# Patient Record
Sex: FEMALE | Race: WHITE | Employment: FULL TIME | URBAN - METROPOLITAN AREA
[De-identification: names, ages, dates, MRNs, and addresses within clinical notes are randomized per-mention and may not be internally consistent; named-entity substitution may affect disease eponyms.]

---

## 2020-10-14 NOTE — PROGRESS NOTES
[facility] / [facility] ([facility]) Mitra Baugh, [address]    Acknowledgment of Informed Consent for Surgical or Medical Procedure and Sedation  I agree to allow doctor(s) Mary Lou Agarwal and his/her associates or assistants, including residents and/or other qualified medical practitioner to perform the following medical treatment or procedure and to administer or direct the administration of sedation as necessary:  Procedure(s): ON THE LEFT: LAPIDUS ARTHRODESIS, [address]  My doctor has explained the following regarding the proposed procedure:   the explanation of the procedure   the benefits of the procedure   the potential problems that might occur during recuperation   the risks and side effects of the procedure which could include but are not limited to severe blood loss, infection, stroke or death   the benefits, risks and side effect of alternative procedures including the consequences of declining this procedure or any alternative procedures   the likelihood of achieving satisfactory results. I acknowledge no guarantee or assurance has been made to me regarding the results. I understand that during the course of this treatment/procedure, unforeseen conditions can occur which require an additional or different procedure. I agree to allow my physician or assistants to perform such extension of the original procedure as they may find necessary. I understand that sedation will often result in temporary impairment of memory and fine motor skills and that sedation can occasionally progress to a state of deep sedation or general anesthesia. I understand the risks of anesthesia for surgery include, but are not limited to, sore throat, hoarseness, injury to face, mouth, or teeth; nausea; headache; injury to blood vessels or nerves; death, brain damage, or paralysis.     I understand that if I have a Limitation of Treatment order in effect during my hospitalization, the order may or may not be in effect during this procedure. I give my doctor permission to give me blood or blood products. I understand that there are risks with receiving blood such as hepatitis, AIDS, fever, or allergic reaction. I acknowledge that the risks, benefits, and alternatives of this treatment have been explained to me and that no express or implied warranty has been given by the hospital, any blood bank, or any person or entity as to the blood or blood components transfused. At the discretion of my doctor, I agree to allow observers, equipment/product representatives and allow photographing, and/or televising of the procedure, provided my name or identity is maintained confidentially. I agree the hospital may dispose of or use for scientific or educational purposes any tissue, fluid, or body parts which may be removed.     ________________________________Date________Time______ am/pm  ([facility])  Patient or Signature of Closest Relative or Legal Guardian    ________________________________Date________Time______am/pm      Page 1 of  1  Witness

## 2020-10-21 RX ORDER — LORATADINE 10 MG/1
10 CAPSULE, LIQUID FILLED ORAL DAILY
COMMUNITY

## 2020-10-21 RX ORDER — RIVAROXABAN 10 MG/1
10 TABLET, FILM COATED ORAL
COMMUNITY

## 2020-10-21 RX ORDER — VALACYCLOVIR HYDROCHLORIDE 1 G/1
TABLET, FILM COATED ORAL
COMMUNITY
Start: 2016-10-13

## 2020-10-21 NOTE — PROGRESS NOTES
procedure. 13. Bring cases for your glasses, contacts, dentures, or hearing aids to protect them while you are in surgery. 16. If you use a CPAP, please bring it with you on the day of your procedure. 17. Do not shave or wax for 72 hours prior to procedure near your operative site  18. FOR WOMAN OF CHILDBEARING AGE ONLY- please bring a urine sample with you on day of surgery or make sure we can collect on arrival.     If you have further questions, you may contact your surgeon's office or us at [phone]     Left instructions on patient's voicemail. Marivel Levo. 10/21/2020 .2:52 PM

## 2020-10-22 ENCOUNTER — ANESTHESIA EVENT (OUTPATIENT)
Dept: OPERATING ROOM | Age: 45
End: 2020-10-22
Payer: COMMERCIAL

## 2020-10-23 ENCOUNTER — ANESTHESIA (OUTPATIENT)
Dept: OPERATING ROOM | Age: 45
End: 2020-10-23
Payer: COMMERCIAL

## 2020-10-23 ENCOUNTER — APPOINTMENT (OUTPATIENT)
Dept: GENERAL RADIOLOGY | Age: 45
End: 2020-10-23
Attending: PODIATRIST
Payer: COMMERCIAL

## 2020-10-23 ENCOUNTER — HOSPITAL ENCOUNTER (OUTPATIENT)
Age: 45
Setting detail: OUTPATIENT SURGERY
Discharge: HOME | End: 2020-10-23
Attending: PODIATRIST | Admitting: PODIATRIST
Payer: COMMERCIAL

## 2020-10-23 VITALS — DIASTOLIC BLOOD PRESSURE: 56 MMHG | OXYGEN SATURATION: 100 % | TEMPERATURE: 97 F | SYSTOLIC BLOOD PRESSURE: 120 MMHG

## 2020-10-23 VITALS
RESPIRATION RATE: 16 BRPM | HEIGHT: 64 IN | TEMPERATURE: 97 F | BODY MASS INDEX: 32.78 KG/M2 | OXYGEN SATURATION: 97 % | WEIGHT: 192 LBS | DIASTOLIC BLOOD PRESSURE: 87 MMHG | SYSTOLIC BLOOD PRESSURE: 142 MMHG | HEART RATE: 85 BPM

## 2020-10-23 LAB — PREGNANCY, URINE: NEGATIVE

## 2020-10-23 PROCEDURE — 2500000003 HC RX 250 WO HCPCS: Performed by: NURSE ANESTHETIST, CERTIFIED REGISTERED

## 2020-10-23 PROCEDURE — 2709999900 HC NON-CHARGEABLE SUPPLY: Performed by: PODIATRIST

## 2020-10-23 PROCEDURE — 2780000010 HC IMPLANT OTHER: Performed by: PODIATRIST

## 2020-10-23 PROCEDURE — 64447 NJX AA&/STRD FEMORAL NRV IMG: CPT | Performed by: ANESTHESIOLOGY

## 2020-10-23 PROCEDURE — C1713 ANCHOR/SCREW BN/BN,TIS/BN: HCPCS | Performed by: PODIATRIST

## 2020-10-23 PROCEDURE — 2580000003 HC RX 258: Performed by: ANESTHESIOLOGY

## 2020-10-23 PROCEDURE — 2500000003 HC RX 250 WO HCPCS: Performed by: PODIATRIST

## 2020-10-23 PROCEDURE — 7100000000 HC PACU RECOVERY - FIRST 15 MIN: Performed by: PODIATRIST

## 2020-10-23 PROCEDURE — 84703 CHORIONIC GONADOTROPIN ASSAY: CPT

## 2020-10-23 PROCEDURE — 7100000010 HC PHASE II RECOVERY - FIRST 15 MIN: Performed by: PODIATRIST

## 2020-10-23 PROCEDURE — 7100000001 HC PACU RECOVERY - ADDTL 15 MIN: Performed by: PODIATRIST

## 2020-10-23 PROCEDURE — 6360000002 HC RX W HCPCS: Performed by: ANESTHESIOLOGY

## 2020-10-23 PROCEDURE — 2580000003 HC RX 258: Performed by: PODIATRIST

## 2020-10-23 PROCEDURE — 2720000010 HC SURG SUPPLY STERILE: Performed by: PODIATRIST

## 2020-10-23 PROCEDURE — 3700000000 HC ANESTHESIA ATTENDED CARE: Performed by: PODIATRIST

## 2020-10-23 PROCEDURE — 6360000002 HC RX W HCPCS: Performed by: NURSE ANESTHETIST, CERTIFIED REGISTERED

## 2020-10-23 PROCEDURE — 73630 X-RAY EXAM OF FOOT: CPT

## 2020-10-23 PROCEDURE — 7100000011 HC PHASE II RECOVERY - ADDTL 15 MIN: Performed by: PODIATRIST

## 2020-10-23 PROCEDURE — 3600000004 HC SURGERY LEVEL 4 BASE: Performed by: PODIATRIST

## 2020-10-23 PROCEDURE — 3700000001 HC ADD 15 MINUTES (ANESTHESIA): Performed by: PODIATRIST

## 2020-10-23 PROCEDURE — 64445 NJX AA&/STRD SCIATIC NRV IMG: CPT | Performed by: ANESTHESIOLOGY

## 2020-10-23 PROCEDURE — 3600000014 HC SURGERY LEVEL 4 ADDTL 15MIN: Performed by: PODIATRIST

## 2020-10-23 DEVICE — POLYAXIAL LOCKING PLATE -  LAPIDUS CROSS-PLATE, LEFT (T10)
Type: IMPLANTABLE DEVICE | Site: FOOT | Status: FUNCTIONAL
Brand: ANCHORAGE

## 2020-10-23 DEVICE — CANNULATED SCREW
Type: IMPLANTABLE DEVICE | Site: FOOT | Status: FUNCTIONAL
Brand: ASNIS

## 2020-10-23 DEVICE — LOCKING SCREW
Type: IMPLANTABLE DEVICE | Site: FOOT | Status: FUNCTIONAL
Brand: VARIAX

## 2020-10-23 DEVICE — ALLOGRAFT HUM TISS 1 CC AMNIO TISS MEMBRN AMNIFLO CRYOPRES: Type: IMPLANTABLE DEVICE | Site: FOOT | Status: FUNCTIONAL

## 2020-10-23 DEVICE — CP LAG SCREW (T10)
Type: IMPLANTABLE DEVICE | Site: FOOT | Status: FUNCTIONAL
Brand: ANCHORAGE

## 2020-10-23 RX ORDER — MIDAZOLAM HYDROCHLORIDE 1 MG/ML
2 INJECTION INTRAMUSCULAR; INTRAVENOUS
Status: COMPLETED | OUTPATIENT
Start: 2020-10-23 | End: 2020-10-23

## 2020-10-23 RX ORDER — EPHEDRINE SULFATE 50 MG/ML
INJECTION, SOLUTION INTRAVENOUS PRN
Status: DISCONTINUED | OUTPATIENT
Start: 2020-10-23 | End: 2020-10-23 | Stop reason: SDUPTHER

## 2020-10-23 RX ORDER — HYDRALAZINE HYDROCHLORIDE 20 MG/ML
5 INJECTION INTRAMUSCULAR; INTRAVENOUS EVERY 5 MIN PRN
Status: DISCONTINUED | OUTPATIENT
Start: 2020-10-23 | End: 2020-10-23 | Stop reason: HOSPADM

## 2020-10-23 RX ORDER — MAGNESIUM HYDROXIDE 1200 MG/15ML
LIQUID ORAL CONTINUOUS PRN
Status: COMPLETED | OUTPATIENT
Start: 2020-10-23 | End: 2020-10-23

## 2020-10-23 RX ORDER — LIDOCAINE HYDROCHLORIDE 20 MG/ML
INJECTION, SOLUTION INTRAVENOUS PRN
Status: DISCONTINUED | OUTPATIENT
Start: 2020-10-23 | End: 2020-10-23 | Stop reason: SDUPTHER

## 2020-10-23 RX ORDER — LIDOCAINE HYDROCHLORIDE AND EPINEPHRINE 10; 10 MG/ML; UG/ML
INJECTION, SOLUTION INFILTRATION; PERINEURAL PRN
Status: DISCONTINUED | OUTPATIENT
Start: 2020-10-23 | End: 2020-10-23 | Stop reason: ALTCHOICE

## 2020-10-23 RX ORDER — MIDAZOLAM HYDROCHLORIDE 1 MG/ML
INJECTION INTRAMUSCULAR; INTRAVENOUS PRN
Status: DISCONTINUED | OUTPATIENT
Start: 2020-10-23 | End: 2020-10-23 | Stop reason: SDUPTHER

## 2020-10-23 RX ORDER — ROPIVACAINE HYDROCHLORIDE 5 MG/ML
INJECTION, SOLUTION EPIDURAL; INFILTRATION; PERINEURAL
Status: COMPLETED
Start: 2020-10-23 | End: 2020-10-23

## 2020-10-23 RX ORDER — DEXAMETHASONE SODIUM PHOSPHATE 4 MG/ML
INJECTION, SOLUTION INTRA-ARTICULAR; INTRALESIONAL; INTRAMUSCULAR; INTRAVENOUS; SOFT TISSUE PRN
Status: DISCONTINUED | OUTPATIENT
Start: 2020-10-23 | End: 2020-10-23 | Stop reason: SDUPTHER

## 2020-10-23 RX ORDER — ROPIVACAINE HYDROCHLORIDE 5 MG/ML
INJECTION, SOLUTION EPIDURAL; INFILTRATION; PERINEURAL PRN
Status: DISCONTINUED | OUTPATIENT
Start: 2020-10-23 | End: 2020-10-23 | Stop reason: SDUPTHER

## 2020-10-23 RX ORDER — FENTANYL CITRATE 50 UG/ML
100 INJECTION, SOLUTION INTRAMUSCULAR; INTRAVENOUS ONCE
Status: COMPLETED | OUTPATIENT
Start: 2020-10-23 | End: 2020-10-23

## 2020-10-23 RX ORDER — DEXAMETHASONE SODIUM PHOSPHATE 4 MG/ML
INJECTION, SOLUTION INTRA-ARTICULAR; INTRALESIONAL; INTRAMUSCULAR; INTRAVENOUS; SOFT TISSUE
Status: COMPLETED
Start: 2020-10-23 | End: 2020-10-23

## 2020-10-23 RX ORDER — LIDOCAINE HYDROCHLORIDE 10 MG/ML
1 INJECTION, SOLUTION EPIDURAL; INFILTRATION; INTRACAUDAL; PERINEURAL
Status: DISCONTINUED | OUTPATIENT
Start: 2020-10-23 | End: 2020-10-23 | Stop reason: HOSPADM

## 2020-10-23 RX ORDER — FENTANYL CITRATE 50 UG/ML
INJECTION, SOLUTION INTRAMUSCULAR; INTRAVENOUS PRN
Status: DISCONTINUED | OUTPATIENT
Start: 2020-10-23 | End: 2020-10-23 | Stop reason: SDUPTHER

## 2020-10-23 RX ORDER — SODIUM CHLORIDE 0.9 % (FLUSH) 0.9 %
10 SYRINGE (ML) INJECTION PRN
Status: DISCONTINUED | OUTPATIENT
Start: 2020-10-23 | End: 2020-10-23 | Stop reason: HOSPADM

## 2020-10-23 RX ORDER — CLINDAMYCIN PHOSPHATE 900 MG/50ML
900 INJECTION INTRAVENOUS ONCE
Status: COMPLETED | OUTPATIENT
Start: 2020-10-23 | End: 2020-10-23

## 2020-10-23 RX ORDER — ONDANSETRON 2 MG/ML
INJECTION INTRAMUSCULAR; INTRAVENOUS PRN
Status: DISCONTINUED | OUTPATIENT
Start: 2020-10-23 | End: 2020-10-23 | Stop reason: SDUPTHER

## 2020-10-23 RX ORDER — FENTANYL CITRATE 50 UG/ML
50 INJECTION, SOLUTION INTRAMUSCULAR; INTRAVENOUS EVERY 5 MIN PRN
Status: DISCONTINUED | OUTPATIENT
Start: 2020-10-23 | End: 2020-10-23 | Stop reason: HOSPADM

## 2020-10-23 RX ORDER — OXYCODONE HYDROCHLORIDE AND ACETAMINOPHEN 5; 325 MG/1; MG/1
1 TABLET ORAL
Status: DISCONTINUED | OUTPATIENT
Start: 2020-10-23 | End: 2020-10-23 | Stop reason: HOSPADM

## 2020-10-23 RX ORDER — ENALAPRILAT 2.5 MG/2ML
1.25 INJECTION INTRAVENOUS
Status: DISCONTINUED | OUTPATIENT
Start: 2020-10-23 | End: 2020-10-23 | Stop reason: HOSPADM

## 2020-10-23 RX ORDER — LABETALOL 20 MG/4 ML (5 MG/ML) INTRAVENOUS SYRINGE
5 EVERY 10 MIN PRN
Status: DISCONTINUED | OUTPATIENT
Start: 2020-10-23 | End: 2020-10-23 | Stop reason: HOSPADM

## 2020-10-23 RX ORDER — PROPOFOL 10 MG/ML
INJECTION, EMULSION INTRAVENOUS PRN
Status: DISCONTINUED | OUTPATIENT
Start: 2020-10-23 | End: 2020-10-23 | Stop reason: SDUPTHER

## 2020-10-23 RX ORDER — PHENYLEPHRINE HYDROCHLORIDE 10 MG/ML
INJECTION INTRAVENOUS PRN
Status: DISCONTINUED | OUTPATIENT
Start: 2020-10-23 | End: 2020-10-23 | Stop reason: SDUPTHER

## 2020-10-23 RX ORDER — SODIUM CHLORIDE 0.9 % (FLUSH) 0.9 %
10 SYRINGE (ML) INJECTION EVERY 12 HOURS SCHEDULED
Status: DISCONTINUED | OUTPATIENT
Start: 2020-10-23 | End: 2020-10-23 | Stop reason: HOSPADM

## 2020-10-23 RX ORDER — ONDANSETRON 2 MG/ML
4 INJECTION INTRAMUSCULAR; INTRAVENOUS
Status: DISCONTINUED | OUTPATIENT
Start: 2020-10-23 | End: 2020-10-23 | Stop reason: HOSPADM

## 2020-10-23 RX ORDER — SODIUM CHLORIDE, SODIUM LACTATE, POTASSIUM CHLORIDE, CALCIUM CHLORIDE 600; 310; 30; 20 MG/100ML; MG/100ML; MG/100ML; MG/100ML
INJECTION, SOLUTION INTRAVENOUS CONTINUOUS
Status: DISCONTINUED | OUTPATIENT
Start: 2020-10-23 | End: 2020-10-23 | Stop reason: HOSPADM

## 2020-10-23 RX ORDER — FENTANYL CITRATE 50 UG/ML
25 INJECTION, SOLUTION INTRAMUSCULAR; INTRAVENOUS EVERY 5 MIN PRN
Status: DISCONTINUED | OUTPATIENT
Start: 2020-10-23 | End: 2020-10-23 | Stop reason: HOSPADM

## 2020-10-23 RX ADMIN — FENTANYL CITRATE 100 MCG: 50 INJECTION INTRAMUSCULAR; INTRAVENOUS at 07:08

## 2020-10-23 RX ADMIN — ONDANSETRON 4 MG: 2 INJECTION INTRAMUSCULAR; INTRAVENOUS at 07:58

## 2020-10-23 RX ADMIN — SODIUM CHLORIDE, SODIUM LACTATE, POTASSIUM CHLORIDE, AND CALCIUM CHLORIDE: 600; 310; 30; 20 INJECTION, SOLUTION INTRAVENOUS at 07:29

## 2020-10-23 RX ADMIN — PHENYLEPHRINE HYDROCHLORIDE 100 MCG: 10 INJECTION INTRAVENOUS at 08:37

## 2020-10-23 RX ADMIN — PHENYLEPHRINE HYDROCHLORIDE 200 MCG: 10 INJECTION INTRAVENOUS at 08:19

## 2020-10-23 RX ADMIN — LIDOCAINE HYDROCHLORIDE 80 MG: 20 INJECTION, SOLUTION INTRAVENOUS at 07:40

## 2020-10-23 RX ADMIN — PHENYLEPHRINE HYDROCHLORIDE 200 MCG: 10 INJECTION INTRAVENOUS at 08:46

## 2020-10-23 RX ADMIN — ROPIVACAINE HYDROCHLORIDE 32 ML: 5 INJECTION, SOLUTION EPIDURAL; INFILTRATION; PERINEURAL at 07:20

## 2020-10-23 RX ADMIN — MIDAZOLAM HYDROCHLORIDE 2 MG: 2 INJECTION, SOLUTION INTRAMUSCULAR; INTRAVENOUS at 07:29

## 2020-10-23 RX ADMIN — FAMOTIDINE 20 MG: 10 INJECTION, SOLUTION INTRAVENOUS at 07:58

## 2020-10-23 RX ADMIN — ROPIVACAINE HYDROCHLORIDE 16 ML: 5 INJECTION, SOLUTION EPIDURAL; INFILTRATION; PERINEURAL at 07:10

## 2020-10-23 RX ADMIN — DEXAMETHASONE SODIUM PHOSPHATE 4 MG: 4 INJECTION, SOLUTION INTRAMUSCULAR; INTRAVENOUS at 07:58

## 2020-10-23 RX ADMIN — EPHEDRINE SULFATE 15 MG: 50 INJECTION, SOLUTION INTRAMUSCULAR; INTRAVENOUS; SUBCUTANEOUS at 09:20

## 2020-10-23 RX ADMIN — SODIUM CHLORIDE, SODIUM LACTATE, POTASSIUM CHLORIDE, AND CALCIUM CHLORIDE: 600; 310; 30; 20 INJECTION, SOLUTION INTRAVENOUS at 06:47

## 2020-10-23 RX ADMIN — DEXAMETHASONE SODIUM PHOSPHATE 4 MG: 4 INJECTION, SOLUTION INTRAMUSCULAR; INTRAVENOUS at 07:20

## 2020-10-23 RX ADMIN — CLINDAMYCIN PHOSPHATE 900 MG: 18 INJECTION, SOLUTION INTRAVENOUS at 07:43

## 2020-10-23 RX ADMIN — PHENYLEPHRINE HYDROCHLORIDE 100 MCG: 10 INJECTION INTRAVENOUS at 07:57

## 2020-10-23 RX ADMIN — PHENYLEPHRINE HYDROCHLORIDE 100 MCG: 10 INJECTION INTRAVENOUS at 08:32

## 2020-10-23 RX ADMIN — PHENYLEPHRINE HYDROCHLORIDE 100 MCG: 10 INJECTION INTRAVENOUS at 08:23

## 2020-10-23 RX ADMIN — PROPOFOL 200 MG: 10 INJECTION, EMULSION INTRAVENOUS at 07:40

## 2020-10-23 RX ADMIN — FENTANYL CITRATE 50 MCG: 50 INJECTION INTRAMUSCULAR; INTRAVENOUS at 09:25

## 2020-10-23 RX ADMIN — MIDAZOLAM HYDROCHLORIDE 2 MG: 1 INJECTION, SOLUTION INTRAMUSCULAR; INTRAVENOUS at 07:08

## 2020-10-23 RX ADMIN — PHENYLEPHRINE HYDROCHLORIDE 200 MCG: 10 INJECTION INTRAVENOUS at 08:41

## 2020-10-23 RX ADMIN — PROPOFOL 50 MG: 10 INJECTION, EMULSION INTRAVENOUS at 07:41

## 2020-10-23 ASSESSMENT — PULMONARY FUNCTION TESTS
PIF_VALUE: 15
PIF_VALUE: 0
PIF_VALUE: 15
PIF_VALUE: 15
PIF_VALUE: 0
PIF_VALUE: 15
PIF_VALUE: 0
PIF_VALUE: 0
PIF_VALUE: 18
PIF_VALUE: 15
PIF_VALUE: 15
PIF_VALUE: 0
PIF_VALUE: 15
PIF_VALUE: 3
PIF_VALUE: 18
PIF_VALUE: 19
PIF_VALUE: 15
PIF_VALUE: 0
PIF_VALUE: 2
PIF_VALUE: 0
PIF_VALUE: 0
PIF_VALUE: 15
PIF_VALUE: 0
PIF_VALUE: 20
PIF_VALUE: 0
PIF_VALUE: 0
PIF_VALUE: 15
PIF_VALUE: 0
PIF_VALUE: 0
PIF_VALUE: 1
PIF_VALUE: 15
PIF_VALUE: 0
PIF_VALUE: 15
PIF_VALUE: 0
PIF_VALUE: 15
PIF_VALUE: 4
PIF_VALUE: 15
PIF_VALUE: 0
PIF_VALUE: 15
PIF_VALUE: 0
PIF_VALUE: 15
PIF_VALUE: 18
PIF_VALUE: 0
PIF_VALUE: 15
PIF_VALUE: 22
PIF_VALUE: 15
PIF_VALUE: 16
PIF_VALUE: 17
PIF_VALUE: 0
PIF_VALUE: 22
PIF_VALUE: 15
PIF_VALUE: 15
PIF_VALUE: 0
PIF_VALUE: 18
PIF_VALUE: 0
PIF_VALUE: 15
PIF_VALUE: 0
PIF_VALUE: 18
PIF_VALUE: 1
PIF_VALUE: 16
PIF_VALUE: 19
PIF_VALUE: 0
PIF_VALUE: 15
PIF_VALUE: 15
PIF_VALUE: 0
PIF_VALUE: 0
PIF_VALUE: 15
PIF_VALUE: 18
PIF_VALUE: 19
PIF_VALUE: 3
PIF_VALUE: 1
PIF_VALUE: 16
PIF_VALUE: 0
PIF_VALUE: 0
PIF_VALUE: 15
PIF_VALUE: 16
PIF_VALUE: 15
PIF_VALUE: 21
PIF_VALUE: 0
PIF_VALUE: 15
PIF_VALUE: 0
PIF_VALUE: 0
PIF_VALUE: 15
PIF_VALUE: 18
PIF_VALUE: 0
PIF_VALUE: 15
PIF_VALUE: 15
PIF_VALUE: 1
PIF_VALUE: 0
PIF_VALUE: 15
PIF_VALUE: 19
PIF_VALUE: 1
PIF_VALUE: 0
PIF_VALUE: 15
PIF_VALUE: 18
PIF_VALUE: 15
PIF_VALUE: 0
PIF_VALUE: 20
PIF_VALUE: 1
PIF_VALUE: 0
PIF_VALUE: 19
PIF_VALUE: 15
PIF_VALUE: 0
PIF_VALUE: 1
PIF_VALUE: 8
PIF_VALUE: 0
PIF_VALUE: 16
PIF_VALUE: 2
PIF_VALUE: 15
PIF_VALUE: 0
PIF_VALUE: 0
PIF_VALUE: 15
PIF_VALUE: 19
PIF_VALUE: 19
PIF_VALUE: 15
PIF_VALUE: 0
PIF_VALUE: 0
PIF_VALUE: 15
PIF_VALUE: 15
PIF_VALUE: 0
PIF_VALUE: 15
PIF_VALUE: 15
PIF_VALUE: 0
PIF_VALUE: 0
PIF_VALUE: 1
PIF_VALUE: 15
PIF_VALUE: 15
PIF_VALUE: 0
PIF_VALUE: 0
PIF_VALUE: 1
PIF_VALUE: 0
PIF_VALUE: 15
PIF_VALUE: 18
PIF_VALUE: 0
PIF_VALUE: 15
PIF_VALUE: 0
PIF_VALUE: 3
PIF_VALUE: 0
PIF_VALUE: 5
PIF_VALUE: 19
PIF_VALUE: 15
PIF_VALUE: 0
PIF_VALUE: 16
PIF_VALUE: 0
PIF_VALUE: 1
PIF_VALUE: 0
PIF_VALUE: 19
PIF_VALUE: 0
PIF_VALUE: 2
PIF_VALUE: 0
PIF_VALUE: 0
PIF_VALUE: 15
PIF_VALUE: 15
PIF_VALUE: 0
PIF_VALUE: 0
PIF_VALUE: 18
PIF_VALUE: 15
PIF_VALUE: 0
PIF_VALUE: 0
PIF_VALUE: 15
PIF_VALUE: 15
PIF_VALUE: 0
PIF_VALUE: 19
PIF_VALUE: 16
PIF_VALUE: 0
PIF_VALUE: 0
PIF_VALUE: 16
PIF_VALUE: 15
PIF_VALUE: 0
PIF_VALUE: 15
PIF_VALUE: 0
PIF_VALUE: 16
PIF_VALUE: 15
PIF_VALUE: 0
PIF_VALUE: 15
PIF_VALUE: 18
PIF_VALUE: 15
PIF_VALUE: 0
PIF_VALUE: 4
PIF_VALUE: 0
PIF_VALUE: 0

## 2020-10-23 ASSESSMENT — PAIN SCALES - GENERAL
PAINLEVEL_OUTOF10: 0

## 2020-10-23 ASSESSMENT — PAIN - FUNCTIONAL ASSESSMENT: PAIN_FUNCTIONAL_ASSESSMENT: 0-10

## 2020-10-23 NOTE — ANESTHESIA POSTPROCEDURE EVALUATION
Department of Anesthesiology  Postprocedure Note    Patient: Julienne Finn  MRN: [id number]  YOB: 1975  Date of evaluation: 10/23/2020  Time:  2:22 PM     Procedure Summary     Date:  10/23/20 Room / Location:  [address] [place] [address]    Anesthesia Start:  1788 Anesthesia Stop:  0945    Procedure:  ON THE LEFT: LAPIDUS ARTHRODESIS, APPLICATION BELOW KNEE SPLINT (Left Foot) Diagnosis:       Hav (hallux abducto valgus), left      (Hav (hallux abducto valgus), left [M20.12])    Surgeon:  Anisha Donnelly DPM Responsible Provider:  Katharina Hernandez MD    Anesthesia Type:  general ASA Status:  1          Anesthesia Type: general    Katrin Phase I: Katrin Score: 10    Katrin Phase II: Katrin Score: 10    Last vitals: Reviewed and per EMR flowsheets.        Anesthesia Post Evaluation    Patient location during evaluation: PACU  Patient participation: complete - patient participated  Level of consciousness: awake  Airway patency: patent  Nausea & Vomiting: no nausea and no vomiting  Complications: no  Cardiovascular status: hemodynamically stable  Respiratory status: acceptable  Hydration status: stable

## 2020-10-23 NOTE — ANESTHESIA PRE PROCEDURE
Department of Anesthesiology  Preprocedure Note       Name:  Reginaldo Hernández   Age:  39 y.o.  :  1975                                          MRN:  [id number]         Date:  10/23/2020      Surgeon: Sima Dawson):  Shweta Ramirez DPM    Procedure: Procedure(s):  ON THE LEFT: LAPIDUS ARTHRODESIS, APPLICATION BELOW KNEE SPLINT    Medications prior to admission:   Prior to Admission medications    Medication Sig Start Date End Date Taking? Authorizing Provider   rivaroxaban (XARELTO) 10 MG TABS tablet Take 10 mg by mouth To start post op   Yes Historical Provider, MD   vitamin D 25 MCG (1000 UT) CAPS Take 2,000 Units by mouth daily   Yes Historical Provider, MD   loratadine (CLARITIN) 10 MG capsule Take 10 mg by mouth daily   Yes Historical Provider, MD   valACYclovir (VALTREX) 1 g tablet 2 BY MOUTH AT THE START OF A COLD SORE AND 2 BY MOUTH 12 HOURS LATER. 10/13/16   Historical Provider, MD       Current medications:    Current Facility-Administered Medications   Medication Dose Route Frequency Provider Last Rate Last Dose    clindamycin (CLEOCIN) 900 mg in dextrose 5 % 50 mL IVPB  900 mg Intravenous Once Shweta Ramirez DPM        lactated ringers infusion   Intravenous Continuous Yetta Fall, DO        lactated ringers infusion   Intravenous Continuous Kiesha Dahl MD        sodium chloride flush 0.9 % injection 10 mL  10 mL Intravenous 2 times per day Kiesha Dahl MD        sodium chloride flush 0.9 % injection 10 mL  10 mL Intravenous PRN Kiesha Dahl MD        lidocaine PF 1 % injection 1 mL  1 mL Intradermal Once PRN Kiesha Dahl MD        midazolam (VERSED) injection 2 mg  2 mg Intravenous Once PRN Kiesha Dahl MD        fentaNYL (SUBLIMAZE) injection 100 mcg  100 mcg Intravenous Once Kiesha Dahl MD           Allergies:     Allergies   Allergen Reactions    Penicillins Hives     Unknown reaction       Problem List:  There is no problem list on file for this patient. Past Medical History:        Diagnosis Date    Fatty liver     nonalcholic    Hyperlipidemia     Plantar fasciitis     Sepsis ([address] 2005    Superficial vein thrombosis 1344    left cephalic vein,due to factor IV       Past Surgical History:        Procedure Laterality Date    KNEE SURGERY      OVARIAN CYST REMOVAL      SHOULDER SURGERY         Social History:    Social History     Tobacco Use    Smoking status: Never Smoker    Smokeless tobacco: Never Used   Substance Use Topics    Alcohol use: Yes     Comment: soc                                Counseling given: Not Answered      Vital Signs (Current):   Vitals:    10/21/20 1459 10/23/20 0613   BP:  (!) 152/99   Pulse:  73   Resp:  18   Temp:  98.3 °F (36.8 °C)   TempSrc:  Oral   SpO2:  96%   Weight: 192 lb (87.1 kg) 192 lb (87.1 kg)   Height: 5' 3.5\" (1.613 m) 5' 3.5\" (1.613 m)                                              BP Readings from Last 3 Encounters:   10/23/20 (!) 152/99   10/21/17 (!) 177/104       NPO Status: Time of last liquid consumption: 2300                        Time of last solid consumption: 2300                        Date of last liquid consumption: 10/22/20                        Date of last solid food consumption: 10/22/20    BMI:   Wt Readings from Last 3 Encounters:   10/23/20 192 lb (87.1 kg)   10/21/17 184 lb 1.4 oz (83.5 kg)     Body mass index is 33.48 kg/m².     CBC:   Lab Results   Component Value Date    WBC 9.1 10/21/2017    RBC 4.56 10/21/2017    HGB 13.8 10/21/2017    HCT 40.6 10/21/2017    MCV 89.1 10/21/2017    RDW 12.7 10/21/2017     10/21/2017       CMP:   Lab Results   Component Value Date     10/21/2017    K 3.5 10/21/2017     10/21/2017    CO2 25 10/21/2017    BUN 9 10/21/2017    CREATININE 0.8 10/21/2017    GFRAA >60 10/21/2017    AGRATIO 1.5 10/21/2017    LABGLOM >60 10/21/2017    GLUCOSE 134 10/21/2017    PROT 6.9 10/21/2017    CALCIUM 9.0 10/21/2017    BILITOT 0.4 10/21/2017 ALKPHOS 48 10/21/2017    AST 28 10/21/2017    ALT 36 10/21/2017       POC Tests: No results for input(s): POCGLU, POCNA, POCK, POCCL, POCBUN, POCHEMO, POCHCT in the last 72 hours. Coags: No results found for: PROTIME, INR, APTT    HCG (If Applicable):   Lab Results   Component Value Date    PREGTESTUR Negative 10/23/2020        ABGs: No results found for: PHART, PO2ART, KVT7EIG, WJK2SMR, BEART, R6ICCQYL     Type & Screen (If Applicable):  No results found for: LABABO, LABRH    Drug/Infectious Status (If Applicable):  No results found for: HIV, HEPCAB    COVID-19 Screening (If Applicable): No results found for: COVID19      Anesthesia Evaluation  Patient summary reviewed and Nursing notes reviewed no history of anesthetic complications:   Airway: Mallampati: III  TM distance: >3 FB   Neck ROM: full  Mouth opening: > = 3 FB Dental: normal exam         Pulmonary:Negative Pulmonary ROS                              Cardiovascular:Negative CV ROS                      Neuro/Psych:   Negative Neuro/Psych ROS              GI/Hepatic/Renal: Neg GI/Hepatic/Renal ROS            Endo/Other: Negative Endo/Other ROS                    Abdominal:           Vascular: negative vascular ROS. Anesthesia Plan      general     ASA 1       Induction: intravenous. Anesthetic plan and risks discussed with patient.                       Kp Garcia MD   10/23/2020

## 2020-10-23 NOTE — PROGRESS NOTES
Anesthesia Dr. Lani Banegas here to do Left Popliteal and Adductor Canal block. O2 placed 2L N/C  Start time:0708  Stop time:0725  Tolerated Well    See flow sheet for vital signs.

## 2020-10-23 NOTE — ANESTHESIA PROCEDURE NOTES
Popliteal    Patient location during procedure: pre-op  Staffing  Anesthesiologist: Veronica Escalera MD  Preanesthetic Checklist  Completed: patient identified, site marked, surgical consent, pre-op evaluation, timeout performed, IV checked, risks and benefits discussed, monitors and equipment checked, anesthesia consent given, oxygen available and patient being monitored  Peripheral Block  Patient position: supine  Prep: ChloraPrep  Patient monitoring: cardiac monitor, continuous pulse ox, frequent blood pressure checks and IV access  Block type: Sciatic  Laterality: left  Injection technique: single-shot  Procedures: ultrasound guided  Local infiltration: ropivacaine  Infiltration strength: 0.5 %  Dose: 2 mL  Popliteal  Provider prep: mask and sterile gloves  Local infiltration: ropivacaine  Needle  Needle type: short-bevel   Needle gauge: 20 G  Needle length: 10 cm  Needle localization: ultrasound guidance  Assessment  Injection assessment: negative aspiration for heme, no paresthesia on injection and local visualized surrounding nerve on ultrasound  Paresthesia pain: none  Slow fractionated injection: yes  Hemodynamics: stable  Additional Notes  Immediately prior to procedure a \"time out\" was called to verify the correct patient, allergies, laterality, procedure and equipment. Popliteal block with  4 mg of decadron and 32 ml of point  5% Naropin and pt tolerated the procedure without problems. Biceps Femoris muscle (long head), Vastus lateralis muscle, Sciatic nerve (Tibia and Common Peroneal Nerves) and Popliteal artery are identified; the tip of the needle and the spread of the local anesthetic around the Tibial and Common Peroneal Nerve are visualized. The Sciatic Nerve (Tibia and Common Peroneal Nerve) appeared to be anatomically normal and there were no abnormal pathologically findings seen.          Reason for block: post-op pain management and at surgeon's request

## 2020-10-23 NOTE — PROGRESS NOTES
Pt arrived anxious asking to urinate pt confused from anesthesia unable to fallow commands will try bedpan latter report received from crna xray at bedside

## 2020-10-23 NOTE — H&P
[address] [place] [address] 1690    [phone]    [facility] Same Day Surgery Update H & P  Department of General Surgery   Surgical Service   Pre-operative History and Physical  Last H & P within the last 30 days. DIAGNOSIS:   Hav (hallux abducto valgus), left [M20.12]    Procedure(s):  ON THE LEFT: LAPIDUS ARTHRODESIS, APPLICATION BELOW KNEE SPLINT     HISTORY OF PRESENT ILLNESS:   Patient with left foot pain and valgus deformity of the 1st MTP joint. The symptoms have been recalcitrant to conservative treatment and the patient presents today for the above procedure. Covid 19:  Patient denies fever, chills, cough or known exposure to Covid-19.        Past Medical History:        Diagnosis Date    Fatty liver     nonalcholic    Hyperlipidemia     Plantar fasciitis     Sepsis ([facility] [address] 2005    Superficial vein thrombosis 8640    left cephalic vein,due to factor IV     Past Surgical History:        Procedure Laterality Date    KNEE SURGERY      OVARIAN CYST REMOVAL      SHOULDER SURGERY       Past Social History:  Social History     Socioeconomic History    Marital status:      Spouse name: Not on file    Number of children: Not on file    Years of education: Not on file    Highest education level: Not on file   Occupational History    Not on file   Social Needs    Financial resource strain: Not on file    Food insecurity     Worry: Not on file     Inability: Not on file   [employer] needs     Medical: Not on file     Non-medical: Not on file   Tobacco Use    Smoking status: Never Smoker    Smokeless tobacco: Never Used   Substance and Sexual Activity    Alcohol use: Yes     Comment: soc    Drug use: No    Sexual activity: Not on file   Lifestyle    Physical activity     Days per week: Not on file     Minutes per session: Not on file    Stress: Not on file   Relationships    Social connections     Talks on phone: Not on file     Gets together: Not on file     Attends Faith service: Not on file     Active member of club or organization: Not on file     Attends meetings of clubs or organizations: Not on file     Relationship status: Not on file    Intimate partner violence     Fear of current or ex partner: Not on file     Emotionally abused: Not on file     Physically abused: Not on file     Forced sexual activity: Not on file   Other Topics Concern    Not on file   Social History Narrative    Not on file         Medications Prior to Admission:      Prior to Admission medications    Medication Sig Start Date End Date Taking? Authorizing Provider   rivaroxaban (XARELTO) 10 MG TABS tablet Take 10 mg by mouth To start post op   Yes Historical Provider, MD   vitamin D 25 MCG (1000 UT) CAPS Take 2,000 Units by mouth daily   Yes Historical Provider, MD   loratadine (CLARITIN) 10 MG capsule Take 10 mg by mouth daily   Yes Historical Provider, MD   valACYclovir (VALTREX) 1 g tablet 2 BY MOUTH AT THE START OF A COLD SORE AND 2 BY MOUTH 12 HOURS LATER. 10/13/16   Historical Provider, MD         Allergies:  Penicillins    PHYSICAL EXAM:      BP (!) 152/99 Comment: Pt states 'anxious'. Anesthesia notified  Pulse 73   Temp 98.3 °F (36.8 °C) (Oral)   Resp 18   Ht 5' 3.5\" (1.613 m)   Wt 192 lb (87.1 kg)   SpO2 96%   BMI 33.48 kg/m²      Airway:  Airway patent with no audible stridor    Heart:  Regular rate and rhythm, No murmur noted    Lungs:  No increased work of breathing, good air exchange, clear to auscultation bilaterally, no crackles or wheezing    Abdomen:  Soft, non-distended, non-tender, normal active bowel sounds, no masses palpated    ASSESSMENT AND PLAN    Patient is a 39 y.o. female with above specified procedure planned. 1.  Patient seen and focused exam done today- no new changes since last physical exam on 10/9/20    2. Access to ancillary services are available per request of the provider.     Yuri Kamara, APRN - CNP     10/23/2020

## 2020-10-23 NOTE — BRIEF OP NOTE
Brief Postoperative Note      Patient: Jessi Subramanian  YOB: 1975  MRN: [id number]    Date of Procedure: 10/23/2020    Pre-Op Diagnosis: Hav (hallux abducto valgus), left [M20.12]    Post-Op Diagnosis: Same       Procedure(s):  ON THE LEFT: LAPIDUS ARTHRODESIS, APPLICATION BELOW KNEE SPLINT    Surgeon(s):  April Farah DPM    Assistant:  Resident: Velma Hua DPM; Alexander Tompkins MS IV    Anesthesia: General with peripheral nerve block via anesthesia    Hemostasis: Pneumatic calf tourniquet 250 mmHg for 96 minutes    Estimated Blood Loss (mL): less than 50     Materials: 3-0 Vicryl, 4-0 Vicryl, 4-0 Monocryl    Injectables: Pre: 4 cc 1% lidocaine with epinephrine; Post: 1 cc Amnio aiden    Complications: None    Specimens:   * No specimens in log *    Implants: [place] CP pocket plate; [place] 3.5 x 16 mm locking screws x 2; [place] 3.0 x 14 mm locking screws x 2 [place] 4.1 x 36 mm lag screw, [place] 4.0 x 36 mm lag screw      Drains: * No LDAs found *    Findings: See operative report    Electronically signed by Velma Hua DPM on 10/23/2020 at 9:37 AM

## 2020-10-23 NOTE — ANESTHESIA PROCEDURE NOTES
Adductor canal    Patient location during procedure: PACU  Staffing  Anesthesiologist: Jan Rosario MD  Performed: anesthesiologist   Preanesthetic Checklist  Completed: patient identified, site marked, surgical consent, pre-op evaluation, timeout performed, IV checked, risks and benefits discussed, monitors and equipment checked, anesthesia consent given, oxygen available and patient being monitored  Peripheral Block  Patient position: supine  Prep: ChloraPrep  Patient monitoring: cardiac monitor, continuous pulse ox, frequent blood pressure checks and IV access  Block type: Saphenous  Laterality: left  Injection technique: single-shot  Procedures: ultrasound guided  Local infiltration: ropivacaine  Infiltration strength: 0.5 %  Dose: 2 mL  Provider prep: mask and sterile gloves  Local infiltration: ropivacaine  Needle  Needle type: short-bevel   Needle gauge: 22 G  Needle length: 10 cm  Needle localization: ultrasound guidance  Assessment  Injection assessment: negative aspiration for heme, no paresthesia on injection and local visualized surrounding nerve on ultrasound  Slow fractionated injection: yes  Hemodynamics: stable  Additional Notes  Sartorius and Vastus Medialis Muscle, Femoral artery and Saphenous nerve are identified; the tip of the needle and the spread of the local anesthetic around the Saphenous nerve are visualized. The Saphenous nerve appeared to be anatomically normal and there were no abnormal pathologically findings seen. Adductor canal block with 16 ml  of point 5% Naropin and 10 ml of NaCl  And pt tolerated the procedure well.   Reason for block: post-op pain management

## 2020-10-23 NOTE — PROGRESS NOTES
PACU Transfer to [facility]    Vitals:    10/23/20 1028   BP: (!) 142/87   Pulse: 85   Resp: 15   Temp: 97 °F (36.1 °C)   SpO2: 100     Bp meet phase II criteria for dc     Intake/Output Summary (Last 24 hours) at 10/23/2020 1035  Last data filed at 10/23/2020 1011  Gross per 24 hour   Intake 205 ml   Output --   Net 205 ml       Pain assessment:  present - adequately treated  Pain Level: 0    Patient transferred to care of THOMAS RN.    10/23/2020 10:35 AM

## 2020-10-23 NOTE — PROGRESS NOTES
Ambulatory Surgery/Procedure Discharge Note    Vitals:    10/23/20 1028   BP: (!) 142/87   Pulse: 85   Resp:    Temp: 97 °F (36.1 °C)   SpO2:        In: 1705 [P.O.:120; I.V.:1585]  Out: -     Restroom use offered before discharge. Yes    Pain assessment:  none and   Pain Level: 0        Patient discharged to home/self care.  Patient discharged via wheel chair by transporter to waiting family/S.O.       10/23/2020 2:07 PM

## 2020-10-23 NOTE — OP NOTE
Operative Note      Patient: Marina Ibrahim  YOB: 1975  MRN: [id number]    Date of Procedure: 10/23/2020    Pre-Op Diagnosis: Hav (hallux abducto valgus), left [M20.12]    Post-Op Diagnosis: Same       Procedure(s): On the left    35849 Lapidus arthrodesis  86423 Application of below-knee splint    Surgeon(s):  Fariba Epps DPM    Assistant:  Resident: Sukh Yu DPM; Melissa Melchor MS IV     Anesthesia: General with peripheral nerve block via anesthesia     Hemostasis: Pneumatic calf tourniquet 250 mmHg for 96 minutes     Estimated Blood Loss (mL): less than 50      Materials: 3-0 Vicryl, 4-0 Vicryl, 4-0 Monocryl     Injectables: Pre: 4 cc 1% lidocaine with epinephrine; Post: 1 cc Amnio aiden     Complications: None    Specimens:   * No specimens in log *    Implants:  Implant Name Type Inv. Item Serial No.  Lot No. [id number] No. Used Action   [place]O-ALLOGRAFT AMNION 1.0ML AMNIFLO CRYOPRES Bone/Graft/Tissue/Human/Synth [place]O-ALLOGRAFT AMNION 1.0ML AMNIFLO CRYOPRES  BONE [employer] ALLOGRAFTS [de-identified] / [id number] Left 1 Implanted   PLATE POLYAXIAL LK LAPIDUS CROSS LT T10 Screw/Plate/Nail/Nigel PLATE POLYAXIAL LK LAPIDUS CROSS LT T10  ERNA: ORTHOPAEDICS  Left 1 Implanted   SCREW CP LAG 4.1X36MM T10 Screw/Plate/Nail/Nigel SCREW CP LAG 4.1X36MM T10  ERNA: ORTHOPAEDICS  Left 1 Implanted   SCREW CHRISTOPHER PTHRD ASNIS III 4.0X36MM Screw/Plate/Nail/Nigel SCREW CHRISTOPHER PTHRD ASNIS III 4.0X36MM  ERNA: ORTHOPAEDICS  Left 1 Implanted   SCREW LK 14MM T10 FULL Screw/Plate/Nail/Nigel SCREW LK 14MM T10 FULL  ERNA: ORTHOPAEDICS  Left 2 Implanted   SCREW LK 16MM T10 FULL Screw/Plate/Nail/Nigel SCREW LK 16MM T10 FULL  ERNA: ORTHOPAEDICS  Left 2 Implanted         Drains: * No LDAs found *    Findings: Preoperatively moderate hallux abductovalgus deformity noted with prominent dorsal medial eminence    Indications for the procedure:  The patient presents to the operating room today due to recalcitrant left foot pain with underlying hallux abductovalgus foot deformity noted on plain film radiographs as an outpatient. Due to exhausting all conservative treatment options including but not limited to shoe gear modifications, orthotics/bracing, NSAIDs, decrease in activity it was determined at this time that the patient will benefit from surgical intervention. All potential risk, benefits, and complications were discussed with the patient prior to the scheduling of the procedure. All patient's questions were answered no guarantees were given. The patient wished proceed with surgery and written informed consent was obtained. Detailed Description of Procedure: The patient was brought from the preoperative area into the operating room placed on the operating room table in the supine position with care to pad all bony prominences. Following a period of sedation, a well-padded pneumatic calf tourniquet was applied to the left lower extremity. Next, the left lower extremity was then scrubbed, prepped, and draped in the usual sterile fashion. A timeout was then performed. The patient, procedures, and operative site were identified and confirmed. Next, utilizing Esmark the left lower extremity was then exsanguinated and the pneumatic calf tourniquet was rapidly inflated 250 mmHg and the following procedures were performed. Procedure #1: Lapidus arthrodesis, left foot: At this time, attention was then directed towards the left foot where an underlying hallux abductovalgus deformity was noted. Next, utilizing a skin marker an approximately 8 cm dorsomedial incision was placed over the first ray starting at the medial cuneiform and carried distally to the base of the proximal phalanx with care to be medial to the extensor hallucis longus tendon and lateral to the dorsomedial neurovascular bundle. Next, utilizing a #15 blade the skin was incised down to the subcutaneous layer.   All bleeders were identified and electrocauterized as they were encountered. All vital neurovascular structures were carefully identified and retracted out of the surgical field. Next, utilizing combination of sharp and blunt dissection the incision was then deepened down through the deep fascial layer. Next, the left foot was then loaded and a lateral soft tissue contracture was noted at the level of the first metatarsophalangeal joint. At this time, it was determined that the patient would benefit from a lateral release. Next, utilizing the same previous surgical incision the incision was then deepened down through the first interspace to the deep transverse intermetatarsal ligament. Next, utilizing a Metzenbaum scissors the deep transverse intermetatarsal ligament was transected. Next, a lateral release was performed utilizing a #15 blade with care to release the tendon of adductor hallucis, fibular suspensory ligament and lateral collateral ligament from their respective insertion along the first metatarsophalangeal joint. Next, the left hallux was then plantarflexed and adducted to release any further lateral soft tissue contracture. Following a lateral release great improvement was noted in lateral deviation of the hallux on the first metatarsal head. Next, attention was then directed back towards the first tarsometatarsal joint where utilizing a #15 blade an periosteal/capsular incision was placed along the length the incision. Next, utilizing a Spiro elevator and #15 blade the periosteum overlying the first tarsometatarsal joint was carefully reflected off of the dorsal, medial, and lateral aspects. Next, utilizing a straight half inch osteotome any plantar capsular structures was freed up. Next, utilizing a sagittal saw and #138 blade the base of the first metatarsal was osteotomized with care to be perpendicular to the long axis of the first metatarsal from dorsal to plantar.   Next, utilizing 1/2 inch straight osteotome the osteotomized bone was freed up from its underlying soft tissue attachments and passed from the operative field to the back table. Next, utilizing the sagittal saw and #138 blade the medial cuneiform was osteotomized from medial to lateral with care to take a wedge of bone along the lateral aspect of the medial cuneiform to allow for reduction of the hallux abductovalgus deformity. Next, utilizing 1/2 inch straight osteotome the osteotomized bone was freed up from its underlying soft tissue attachments and passed from the operative field to the back table. Next, the first ray was manipulated to reduce the underlying hallux abductovalgus deformity and temporarily fixed in place utilizing an 0.062 K wire from distal medial to proximal lateral across the first tarsometatarsal joint. Next, utilizing intraoperative live fluoroscopy reduction of the underlying hallux abductovalgus deformity was confirmed. Next, the temporary K wire was then removed and attention was directed towards joint preparation. Upon inspection of the joint a small ridge of articular cartilage was noted to the medial aspect of the cuneiform and was debrided down to healthy bleeding subchondral bone utilizing a curette. Next, the base of the first metatarsal and the medial cuneiform were subchondrally drilled utilizing a 2-0 drill bit to aid in vascular ingrowth and osseous union. Next, the first ray manipulated to reduce the underlying hallux abductovalgus deformity and temporarily fixated in place utilizing an 0.062 K wire from distal medial to proximal lateral across the first tarsometatarsal joint. Next, utilizing intraoperative live fluoroscopy reduction of deformity was noted with excellent repositioning and apposition of the first tarsometatarsal joint.   Next, a second point of prairie fixation was placed from medial to lateral from the first metatarsal to the second metatarsal.  Next, utilizing the [employer] CP template this was then placed over the first tarsometatarsal arthrodesis site and temporarily fixated in place with the provided guidewire. Next, utilizing intraoperative live fluoroscopy placement of the template was noted to be in excellent position. Next, the template was then removed and utilizing the provided reamer, the first metatarsal shaft was reamed down to the laser line. The temporary guidewire was then removed. Next, a [place] CP Payne Lapidus plate was placed across the first tarsometatarsal joint and temporarily fixed in place with the provided BB tacks. Next, utilizing intraoperative live fluoroscopy placement of the plate was noted to be in excellent position. Next, utilizing the recommended manufactures instructions and modified AO technique 3.5 mm locking screws were placed within the distal 2 screw holes of the plate. Next, utilizing the recommended manufactures instructions and modified AO technique a Kathy 4.1 x 36 mm lag screw was placed from dorsal distal to proximal plantar across the first tarsometatarsal joint with good compression and 2 finger tightness noted. Next, utilizing the recommended manufactures instructions and modified AO technique 3.5 mm locking screws were placed within the proximal 2 screw holes of the plate. Next, utilizing intraoperative live fluoroscopy placement the hardware was noted to be in excellent position. Next, utilizing the recommended manufactures instructions and modified AO technique a [place] 4.0 x 36 mm Asnis lag screw was placed from proximal dorsal to distal plantar across the first tarsometatarsal joint arthrodesis site with good compression and 2 finger tightness noted. Next, utilizing intraoperative live fluoroscopy reduction of the deformity was noted to be in excellent position with hardware intact. Next, the wound was then irrigated with copious amounts of normal sterile saline.   At this time it was determined to resect the prominent dorsomedial eminence at the level of the first metatarsal head. Next, utilizing a sagittal saw and #138 blade the prominent dorsomedial eminence was osteotomized from distal medial to proximal medial.  Next, utilizing the sagittal saw and #138 blade the first metatarsal head was back brushed to anatomic contour to relieve any further bony prominences/overhanging edge of bone. Next, the wound was then irrigated with copious amounts normal sterile saline. At this time attention was then directed towards wound closure. Next, utilizing 3-0 Vicryl the periosteal/capsular layer was reapproximatedin a continuous running locking suture fashion. Next, the subcutaneous layer was reapproximated utilizing 4-0 Vicryl in a continuous running suture fashion. Next, the skin edges were then reapproximated utilizing 4-0 Monocryl in a running subcuticular suture fashion. Next, the incision site was then dressed with Dermabond, Adaptic, sterile 4 x 8's, and sterile cast padding. At this time the pneumatic calf tourniquet was rapidly deflated and prompt hyperemic response was noted to the digits of the left foot. Procedure #2: Application of below-knee splint, left: Next, copious amounts of cast padding was applied from plantar aspect of the foot up to the mid calf and secured in place utilizing Ace bandage. Next, utilizing a moistened padded splint material a posterior splint was fashioned from the plantar aspect of the foot up to the mid calf and secured in place utilizing Ace bandage. Next, the left lower extremity was then splinted to 90 degrees prevent any acquired equinus deformity contracture and relieve tension on the surgical incision site. End of procedure: The patient tolerated the procedure and anesthesia well. The patient was transferred from the operating room to PACU with vital signs stable and vascular status intact to the left lower extremity.   Following a period of postoperative monitoring, the patient will be discharged to home with written and oral wound care instructions per Dr. Antony Chamorro. The patient is to follow-up with Dr. Antony Chamorro within the next 5 to 7 days for her first postoperative visit. The patient is to keep the dressing clean, dry, and intact. The patient is to call if any complications occur. The patient is strict nonweightbearing to left lower extremity. Dictated on behalf Dr. Antony Chamorro, D.P.M.     Electronically signed by Farhad Lane DPM on 10/23/2020 at 1:26 PM    Dr. Raymond Olivares, [address] [place] [address]   Office: [phone]  Cell: [phone]

## 2022-02-07 ENCOUNTER — OFFICE VISIT (OUTPATIENT)
Dept: ORTHOPEDIC SURGERY | Age: 47
End: 2022-02-07
Payer: COMMERCIAL

## 2022-02-07 DIAGNOSIS — R52 PAIN: Primary | ICD-10-CM

## 2022-02-07 DIAGNOSIS — S39.012A LUMBOSACRAL STRAIN, INITIAL ENCOUNTER: ICD-10-CM

## 2022-02-07 PROCEDURE — 99204 OFFICE O/P NEW MOD 45 MIN: CPT | Performed by: PHYSICIAN ASSISTANT

## 2022-02-07 RX ORDER — CYCLOBENZAPRINE HCL 10 MG
10 TABLET ORAL 3 TIMES DAILY PRN
Qty: 90 TABLET | Refills: 0 | Status: SHIPPED | OUTPATIENT
Start: 2022-02-07 | End: 2022-02-17

## 2022-02-07 RX ORDER — METHYLPREDNISOLONE 4 MG/1
TABLET ORAL
Qty: 1 KIT | Refills: 0 | Status: SHIPPED | OUTPATIENT
Start: 2022-02-07 | End: 2022-03-01

## 2022-02-08 PROBLEM — S39.012A LUMBOSACRAL STRAIN: Status: ACTIVE | Noted: 2022-02-08

## 2022-02-08 NOTE — PROGRESS NOTES
History of present illness:   Ms. Alek Catherine is a pleasant 55 y.o. female kindly referred by Janelle Smith MD for consultation regarding her LBP and right buttock pain. She states her pain began rather suddenly 3 weeks ago. She does not recall a specific injury but thinks it may be related to her working out routine. Her pain has steadily worsened since onset. She rates her back pain 9/ 10 VAS and right buttock pain 7/10 VAS. She describes the pain as aching, throbbing. The leg pain radiates down the posterior right buttocks. He denies any significant leg pain. She denies numbness and tingling lower extremities. She denies weakness of her left or right leg. She denies bowel or bladder dysfunction and saddle anesthesia. She states she can sit for a maximum of 5-10 minutes and stand for a maximum 5-10 minutes. The pain interrupts her sleep. She cannot lay on her back. She can only lay on her left or right side. She has tried ibuprofen without relief. Past medical history:  Her past medical history has been reviewed. 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 Past Surgical History:   Procedure Laterality Date    BUNIONECTOMY Left 10/23/2020    ON THE LEFT: LAPIDUS ARTHRODESIS, APPLICATION BELOW KNEE SPLINT performed by Bettye Arredondo DPM at [facility] 14 CYST REMOVAL      SHOULDER SURGERY           Her medications and allergies were reviewed. Current Outpatient Medications   Medication Sig Dispense Refill    methylPREDNISolone (MEDROL, CECELIA,) 4 MG tablet Take by mouth.  6 po day one 5 po day 2 4 po day 3 3 po day 4 2 po day 5 1 po day 6. 1 kit 0    cyclobenzaprine (FLEXERIL) 10 MG tablet Take 1 tablet by mouth 3 times daily as needed for Muscle spasms 90 tablet 0    valACYclovir (VALTREX) 1 g tablet 2 BY MOUTH AT THE START OF A COLD SORE AND 2 BY MOUTH 12 HOURS LATER.  rivaroxaban (XARELTO) 10 MG TABS tablet Take 10 mg by mouth To start post op      vitamin D 25 MCG (1000 UT) CAPS Take 2,000 Units by mouth daily      loratadine (CLARITIN) 10 MG capsule Take 10 mg by mouth daily       No current facility-administered medications for this visit. Her social history has been reviewed. Social History     Occupational History    Not on file   Tobacco Use    Smoking status: Never Smoker    Smokeless tobacco: Never Used   Substance and Sexual Activity    Alcohol use: Yes     Comment: soc    Drug use: No    Sexual activity: Not on file         Her family history has been reviewed. History reviewed. No pertinent family history. Review of Systems:  I have reviewed the clinically relevant past medical history, medications, allergies, family history, social history, and 13 point Review of Systems from the patient's recent history form & documented any details relevant to today's presenting complaints in the history above. The patient's self-reported past medical history, medications, allergies, family history, social history, and Review of Systems form from today's date have been scanned into the chart under the \"Media\" tab. Patient's review of symptoms was reviewed and is significant for back pain and negative for recent weight loss, fatigue, chills, visual disturbances, blood in stool or urine, recent infection. Physical examination:  Ms. Joey Potter's most recent vitals: There is no height or weight on file to calculate BMI. General exam:  She is well-developed and well-nourished, is in obvious discomfort and alert and oriented to person, place, and time. She demonstrates appropriate mood and affect. Her skin is warm and dry.    Her gait is normal and she walks heel to toe without significant limp or instability. Back:  She stands with slight lumbar flexion. Her lumbar flexion is limited. Extension and lateral bending are moderately reduced with pain. She has mild tenderness over her lumbar spine with paraspinous muscle spasm. The skin over her lumbar spine is normal without a surgical scar. Lower extremities:  She has 5/5 motor strength of bilateral lower extremities. She has a negative straight leg raise on the left and negative straight leg raise on the right. Deep tendon reflexes:   Left patella 2+. Right patella 2+. Left achilles 2+. Right achilles 2+. Sensation is intact to light touch L3 to S1 bilaterally. She has no clonus. Hip range of motion is normal, and pain-free. Stinchfield test is negative. Abdomen:  Non-tender and non-distended. No rash. Imaging:  X rays were obtained in the office today. AP and Lateral Lumbar Spine Radiographs: There is minimal degenerative Disc Disease. There is minimal facet Arthropathy. There is no Spondylolisthesis. Diagnosis:      ICD-10-CM    1. Pain  R52 XR LUMBAR SPINE (2-3 VIEWS)   2. Lumbosacral strain, initial encounter  O91.696N Ambulatory referral to Physical Therapy          Assessment/ Plan:    Low back pain and right buttock pain. More than likely this represents a lumbar sacral strain related to her workout routine. She may have a bulging or small HNP. She remains neurologically intact in both lower extremities. I had an extensive discussion with Ms. Trinidad Brooks and/or family regarding the natural history, etiology, and long term consequences of her condition. I have presented reasonable alternatives to the patient's proposed care, treatment, and services. Risks and benefits of the treatment options also reviewed in detail. I have outlined a treatment plan with them. She has had full opportunity to ask her questions. I have answered them all to her satisfaction. I feel that Ms. Destinee Potter understands our discussion today     New Medications prescribed today-Medrol Dosepak, Flexeril. PT-Rx for PT was provided today. Further Imaging-at this time, I do not feel MRI is recommended or necessary. Follow up- 3-4 weeks. She was instructed to call us emergently if she begins to experience bowel or bladder dysfunction, saddle anesthesia, increasing muscle weakness, or onset/ worsening leg symptoms. Abelino Sullivan PA-C   Senior Physician Assistant   [facility]                                         Disclaimer: This note was generated with use of a verbal recognition program (DRAGON) and an attempt was made to check for errors. It is possible that there are still dictated errors within this office note. If so, please bring any significant errors to my attention for an addendum. All efforts were made to ensure that this office note is accurate.

## 2022-02-15 ENCOUNTER — HOSPITAL ENCOUNTER (OUTPATIENT)
Dept: PHYSICAL THERAPY | Age: 47
Setting detail: THERAPIES SERIES
Discharge: HOME | End: 2022-02-15
Payer: COMMERCIAL

## 2022-02-15 PROCEDURE — 97161 PT EVAL LOW COMPLEX 20 MIN: CPT

## 2022-02-15 PROCEDURE — 97110 THERAPEUTIC EXERCISES: CPT

## 2022-02-15 NOTE — FLOWSHEET NOTE
[facility] [place] [facility], [facility]  [address] [place] [place], [facility]  Phone: [phone]   Fax:     [phone]    Physical Therapy Treatment Note/ Progress Report:     Date:  2/15/2022    Patient Name:  Tyra Bowen    :  1975  MRN: [id number]    Pertinent Medical History: Additional Pertinent Hx: HLD, sepsis, superficial thrombosis, bunionectomy    Medical/Treatment Diagnosis Information:  · Diagnosis: S39.012A (ICD-10-CM) - Lumbosacral strain, initial encounter  · Treatment Diagnosis: Decreased functional mobility 2/2 LBP    Insurance/Certification information:  PT Insurance Information: [employer] [employer]  Physician Information:  Referring Practitioner: Almaz Fernandez of care signed (Y/N): sent    Date of Patient follow up with Physician:      Progress Report: []  Yes  [x]  No     Date Range for reporting period:  Beginnin/15/2022  Ending:    Progress report due (10 Rx/or 30 days whichever is less):      Recertification due (POC duration/ or 90 days whichever is less):4/15     Visit # POC/ Insurance Allowable Auth Needed   1 bomn []Yes    []No       History of Present condition MD note   Sudden onset back and RLE pain, possibly related to workout routine. Pain down to right buttock. She states her pain began rather suddenly 3 weeks ago. Ion Rowley does not recall a specific injury but thinks it may be related to her working out routine. Started having shooting pains with sit to stand at work, then unable to move. Did the medrol dose pack, and had some relief by day 5,       AP and Lateral Lumbar Spine Radiographs: There is minimal degenerative Disc Disease. There is minimal facet Arthropathy. There is no Spondylolisthesis.     SUBJECTIVE: Aching throbbing pain LS and right buttocks.   7-9/10     Relevant Medical History:Additional Pertinent Hx: HLD, sepsis, superficial thrombosis, bunionectomy  Functional Scale/Score: [place] =  80 eval 2/14     Pain Scale:7-9/10  Easing factors: no specific position is an issue  Provocative factors:   sitting, sit to stand     Repeated Movements:worse with RFIS     ROM   Comments   Lumbar Flex Mod ltd Inc rad   Lumbar Ext Mod ltd        ROM LEFT RIGHT Comments   Lumbar Side Bend Mod ltd Mod ltd     Lumbar Rotation Mod ltd Mod ltd     Quadrant         Hip Flexion         Hip Abd wfl all wfl all            RESTRICTIONS/PRECAUTIONS: none noted    Exercises/Interventions:     Therapeutic Ex (25394)   Min: Resistance/Reps Notes/Cues        Prone position     Legs to right         Prop on elbows          Press ups     Press up with sag     Press up with OP          Standing extension          Prone hip ext          Therapeutic Activity (11384) Min:      Posture and body mechanics                    NMR re-education (89096)   Min:     Mf Activation- re-ed     TrA Re-ed activation     Glute Max re-ed activation          Manual Intervention ([id number]) Min:                Modalities  Min:             Other Therapeutic Activities:  Pt was educated on PT POC, Diagnosis, Prognosis, pathomechanics as well as frequency and duration of scheduling future physical therapy appointments. Time was also taken on this day to answer all patient questions and participation in PT. Reviewed appointment policy in detail with patient and patient verbalized understanding. Home Exercise Program: Patient instructed in the following for HEP:   . Patient verbalized/demonstrated understanding and was issued written handout.       Therapeutic Exercise and NMR EXR  [] (12154) Provided verbal/tactile cueing for activities related to strengthening, flexibility, endurance, ROM  for improvements in proximal hip and core control with self care, mobility, lifting and ambulation.  [] (94385) Provided verbal/tactile cueing for activities related to improving balance, coordination, kinesthetic sense, posture, motor skill, proprioception  to assist with core control in self care, mobility, lifting, and ambulation. Therapeutic Activities:    [] (81512 or 55148) Provided verbal/tactile cueing for activities related to improving balance, coordination, kinesthetic sense, posture, motor skill, proprioception and motor activation to allow for proper function  with self care and ADLs  [] (51884) Provided training and instruction to the patient for proper core and proximal hip recruitment and positioning with ambulation re-education     Home Exercise Program:    [] (60576) Reviewed/Progressed HEP activities related to strengthening, flexibility, endurance, ROM of core, proximal hip and LE for functional self-care, mobility, lifting and ambulation   [] (58707) Reviewed/Progressed HEP activities related to improving balance, coordination, kinesthetic sense, posture, motor skill, proprioception of core, proximal hip and LE for self care, mobility, lifting, and ambulation      Manual Treatments:  PROM / STM / Oscillations-Mobs:  G-I, II, III, IV (PA's, Inf., Post.)  [] (19728) Provided manual therapy to mobilize proximal hip and LS spine soft tissue/joints for the purpose of modulating pain, promoting relaxation,  increasing ROM, reducing/eliminating soft tissue swelling/inflammation/restriction, improving soft tissue extensibility and allowing for proper ROM for normal function with self care, mobility, lifting and ambulation.        Charges:  Timed Code Treatment Minutes: 30   Total Treatment Minutes: 45     [x] EVAL (LOW) 98003 (typically 20 minutes face-to-face)  [] EVAL (MOD) 69523 (typically 30 minutes face-to-face)  [] EVAL (HIGH) 40068 (typically 45 minutes face-to-face)  [] RE-EVAL     [x] VI(65826) x   2  [] Dry needle 1 or 2 Muscles (69902)  [] NMR (47862) x     [] Dry needle 3+ Muscles (86464)  [] Manual (11392) x     [] Ultrasound (04984) x  [] TA (33269) x     [] Mech Traction (66706)  [] ES(attended) (07697)     [] ES (un) expected towards functional goals listed. [] Progression is slowed due to complexities/Impairments listed. [] Progression has been slowed due to co-morbidities. [x] Plan just implemented, too soon to assess goals progression <30days   [] Goals require adjustment due to lack of progress  [] Patient is not progressing as expected and requires additional follow up with physician  [] Other:    Prognosis for POC: [x] Good [] Fair  [] Poor    Patient requires continued skilled intervention: [x] Yes  [] No        PLAN: See eval  [] Continue per plan of care [] Alter current plan (see comments)  [x] Plan of care initiated [] Hold pending MD visit [] Discharge    Electronically signed by: Cristela Maldonado PT DPT, MS  [id number]    Note: If patient does not return for scheduled/recommended follow up visits, this note will serve as a discharge from care along with the most recent update on progress.

## 2022-02-15 NOTE — PLAN OF CARE
[facility] [place] [facility], [facility]  [address] [place], [facility]  Phone: [phone]   Fax:     [phone]                                                       Physical Therapy Certification    Dear Referring Practitioner: Aren Perez,    We had the pleasure of evaluating the following patient for physical therapy services at [facility]. A summary of our findings can be found in the initial assessment below. This includes our plan of care. If you have any questions or concerns regarding these findings, please do not hesitate to contact me at the office phone number checked above. Thank you for the referral.       Physician Signature:_______________________________Date:__________________  By signing above (or electronic signature), therapists plan is approved by physician        Patient: Alexandro Goodpasture   : 1975   MRN: [id number]  Referring Physician: Referring Practitioner: Aren Perez      Evaluation Date: 2/15/2022      Medical Diagnosis Information:  Diagnosis: T57.252Q (ICD-10-CM) - Lumbosacral strain, initial encounter   Treatment Diagnosis: Decreased functional mobility 2/2 LBP                                         Insurance information: PT Insurance Information: [employer]     Precautions/ Contra-indications: none noted  Latex Allergy:  [x]NO      []YES  Preferred Language for Healthcare:   [x]English       []other:    C-SSRS Triggered by Intake questionnaire (Past 2 wk assessment ):   [x] No, Questionnaire did not trigger screening.   [] Yes, Patient intake triggered C-SSRS Screening      [] C-SSRS Screening completed  [] PCP notified via Epic      History of Present condition MD note   Sudden onset back and RLE pain, possibly related to workout routine. Pain down to right buttock. She states her pain began rather suddenly 3 weeks ago.   She does not recall a specific injury but thinks it may be related to her working out routine. Started having shooting pains with sit to stand at work, then unable to move. Did the medrol dose pack, and had some relief by day 5,      AP and Lateral Lumbar Spine Radiographs: There is minimal degenerative Disc Disease. There is minimal facet Arthropathy. There is no Spondylolisthesis. SUBJECTIVE: Aching throbbing pain LS and right buttocks. 7-9/10    Relevant Medical History:Additional Pertinent Hx: HLD, sepsis, superficial thrombosis, bunionectomy  Functional Scale/Score: [place] =  80 eval 2/14    Pain Scale:7-9/10  Easing factors: no specific position is an issue  Provocative factors:      Type: [x]Constant   []Intermittent  []Radiating []Localized []other:     Numbness/Tingling: None    Occupation/School: desk work    Living Status/Prior Level of Function: Independent with ADLs and IADLs,     OBJECTIVE:   Palpation:TTP right gluts/ sciatic notch    Functional Mobility/Transfers: effortful    Posture: decreased lumbar lordosis.     Gait:antalgic gait    Bandages/Dressings/Incisions: NA    Repeated Movements:worse with RFIS    ROM  Comments   Lumbar Flex Mod ltd Inc rad   Lumbar Ext Mod ltd      ROM LEFT RIGHT Comments   Lumbar Side Bend Mod ltd Mod ltd    Lumbar Rotation Mod ltd Mod ltd    Quadrant      Hip Flexion      Hip Abd wfl all wfl all    Hip ER      Hip IR      Hip Extension      Knee Ext      Knee Flex      Hamstring Flex      Piriformis      Parminder test                Myotomes/Strength Left  Right Comments   [x]ALL NORMAL MYOTOMES      Hip Abd      Hip Ext      Hip flexion (L1-L2 femoral)      Knee extension (L2-L4 femoral)      Knee flexion (S1 sciatic)      Dorsiflexion (L4-L5 deep peroneal)      Great Toe Ext (L5 deep peroneal nerve)      Ankle Eversion (S1-S2 super peroneal)      Ankle PF(S1-S2 tibial)      fairMultifidus      Transverse Ab fair       Dermatomes Normal Abnormal Comments   [x]ALL NORMAL            inguinal area (L1)  [] []    anterior mid-thigh (L2) [] []    distal ant thigh/med knee (L3) [] []    medial lower leg and foot (L4) [] []    lateral lower leg and foot (L5) [] []    posterior calf (S1) [] []    medial calcaneus (S2) [] []      Reflexes Normal Abnormal Comments   [x]ALL NORMAL            S1-2 Seated achilles [] []    S1-2 Prone knee bend [] []    L3-4 Patellar tendon [] []    Clonus [] []    Babinski [] []    Mckenzie's [] []      Joint mobility:    [x]Normal    []Hypo   []Hyper    Neurodynamics:     Orthopedic Special Tests:    Neural dynamic tension testing Normal Abnormal Comments   Slump Test  - Degree of knee flexion:  [x] []    SLR  [] []    0-30 [] [x] R   30-70 [] [x] R   Femoral nerve (L2-4) [] []       Normal Abnormal N/A Comments   Fwd Bend-aberrant or innominate mvmt) [] [] []    Trendelenburg [] [] []    Kemps/Quadrant [] [] []    [place] [] [] []    JESSICA/Yonny [] [] []    Hip scour [] [] []    Supine to sit [] [] []    Prone knee bend [] [] []           Hip thrust [] [] []    SI distraction/compression [] [] []    Sacral Spring/thrust [] [] []               [x] Patient history, allergies, meds reviewed. Medical chart reviewed. See intake form. Review Of Systems (ROS):  [x]Performed Review of systems (Integumentary, CardioPulmonary, Neurological) by intake and observation. Intake form has been scanned into medical record. Patient has been instructed to contact their primary care physician regarding ROS issues if not already being addressed at this time.       Co-morbidities/Complexities (which will affect course of rehabilitation)  []None     2       Arthritic conditions   []Rheumatoid arthritis (M05.9)  [x]Osteoarthritis (M19.91)   Cardiovascular conditions   []Hypertension (I10)  [x]Hyperlipidemia (E78.5)  []Angina pectoris (I20)  []Atherosclerosis (I70)  []CVA Musculoskeletal conditions   []Disc pathology   []Congenital spine pathologies   []Prior surgical intervention  []Osteoporosis (M81.8)  []Osteopenia (M85.8)   Endocrine conditions   []Hypothyroid (E03.9)  []Hyperthyroid Gastrointestinal conditions   []Constipation (U11.13)   Metabolic conditions   []Morbid obesity (E66.01)  []Diabetes type 1(E10.65) or 2 (E11.65)   []Neuropathy (G60.9)     Pulmonary conditions   []Asthma (J45)  []Coughing   []COPD (J44.9)   Psychological Disorders  []Anxiety (F41.9)  []Depression (F32.9)   []Other:   []Other:           Barriers to/and or personal factors that will affect rehab potential:              []Age  []Sex    []Smoker              []Motivation/Lack of Motivation                        []Co-Morbidities              []Cognitive Function, education/learning barriers              []Environmental, home barriers              []profession/work barriers  []past PT/medical experience  []other:  Justification:     Falls Risk Assessment (30 days):   [x] Falls Risk assessed and no intervention required. [] Falls Risk assessed and Patient requires intervention due to being higher risk   TUG score (>12s at risk):     [] Falls education provided, including:         ASSESSMENT: Pt with sudden onset left radicular pain, extension bias. Will benefit from extension approach with core stabilization as indicated.       Functional Impairments:     [x]Noted lumbar/proximal hip hypomobility   []Noted lumbosacral and/or generalized hypermobility   []Decreased Lumbosacral/hip/LE functional ROM   [x]Decreased core/proximal hip strength and neuromuscular control    []Decreased LE functional strength    []Abnormal reflexes/sensation/myotomal/dermatomal deficits  []Reduced balance/proprioceptive control    []other:      Functional Activity Limitations (from functional questionnaire and intake)   [x]Reduced ability to tolerate prolonged functional positions   [x]Reduced ability or difficulty with changes of positions or transfers between positions   [x]Reduced ability to maintain good posture and demonstrate good body mechanics with sitting, bending, and lifting   [x]Reduced ability to sleep   [x] Reduced ability or tolerance with driving and/or computer work   [x]Reduced ability to perform lifting, reaching, carrying tasks   [x]Reduced ability to squat   [x]Reduced ability to forward bend   [x]Reduced ability to ambulate prolonged functional periods/distances/surfaces   [x]Reduced ability to ascend/descend stairs   []other:       Participation Restrictions   [x]Reduced participation in self care activities   [x]Reduced participation in home management activities   [x]Reduced participation in work activities   [x]Reduced participation in social activities. [x]Reduced participation in sport/recreational activities. Classification:   []Signs/symptoms consistent with Lumbar instability/stabilization subgroup. []Signs/symptoms consistent with Lumbar mobilization/manipulation subgroup, myotomes and dermatomes intact. Meets manipulation criteria. [x]Signs/symptoms consistent with Lumbar direction specific/centralization subgroup   []Signs/symptoms consistent with Lumbar traction subgroup       []Signs/symptoms consistent with lumbar facet dysfunction   []Signs/symptoms consistent with lumbar stenosis type dysfunction   []Signs/symptoms consistent with nerve root involvement including myotome & dermatome dysfunction   []Signs/symptoms consistent with post-surgical status including: decreased ROM, strength and function.    []signs/symptoms consistent with pathology which may benefit from Dry needling     []other:      Prognosis/Rehab Potential:      [x]Excellent   []Good    []Fair   []Poor    Tolerance of evaluation/treatment:    [x]Excellent   []Good    []Fair   []Poor     Physical Therapy Evaluation Complexity Justification  [x] A history of present problem with:  [] no personal factors and/or comorbidities that impact the plan of care;  [x]1-2 personal factors and/or comorbidities that impact the plan of care  []3 personal factors Independent in HEP and progression per patient tolerance, in order to prevent re-injury. [] Progressing: [] Met: [] Not Met: [] Adjusted  2. Patient will have a decrease in pain to facilitate improvement in movement, function, and ADLs as indicated by Functional Deficits. [] Progressing: [] Met: [] Not Met: [] Adjusted    Long Term Goals: To be achieved in: 8 weeks  1. Disability index score of 20% or less for the ADIEL/[place] to assist with reaching prior level of function. [] Progressing: [] Met: [] Not Met: [] Adjusted  2. Patient will demonstrate increased AROM to WNL, good LS mobility, good hip ROM to allow for proper joint functioning as indicated by patients Functional Deficits. [] Progressing: [] Met: [] Not Met: [] Adjusted  3. Patient will demonstrate an increase in Strength to good proximal hip and core activation to allow for proper functional mobility as indicated by patients Functional Deficits. [] Progressing: [] Met: [] Not Met: [] Adjusted  4. Patient will return to 80% functional activities without increased symptoms or restriction. [] Progressing: [] Met: [] Not Met: [] Adjusted  5. To be able to get back to working out. [] Progressing: [] Met: [] Not Met: [] Adjusted     Electronically signed by:  Krystina Shea PT DPT, MS  [id number]        Note: If patient does not return for scheduled/recommended follow up visits, this note will serve as a discharge from care along with the most recent update on progress.

## 2022-02-18 ENCOUNTER — HOSPITAL ENCOUNTER (OUTPATIENT)
Dept: PHYSICAL THERAPY | Age: 47
Setting detail: THERAPIES SERIES
Discharge: HOME | End: 2022-02-18
Payer: COMMERCIAL

## 2022-02-18 PROCEDURE — 97110 THERAPEUTIC EXERCISES: CPT

## 2022-02-18 NOTE — FLOWSHEET NOTE
[facility] [place] [facility], [facility]  [address] [place], [facility]  Phone: [phone]   Fax:     [phone]    Physical Therapy Treatment Note/ Progress Report:     Date:  2022    Patient Name:  Alisia Vazquez    :  1975  MRN: [id number]    Pertinent Medical History: Additional Pertinent Hx: HLD, sepsis, superficial thrombosis, bunionectomy    Medical/Treatment Diagnosis Information:  · Diagnosis: S39.012A (ICD-10-CM) - Lumbosacral strain, initial encounter  · Treatment Diagnosis: Decreased functional mobility 2/2 LBP    Insurance/Certification information:  PT Insurance Information: [employer]  Physician Information:  Referring Practitioner: Yasemin Otero signed (Y/N): sent    Date of Patient follow up with Physician:      Progress Report: []  Yes  [x]  No     Date Range for reporting period:  Beginnin2022  Ending:    Progress report due (10 Rx/or 30 days whichever is less):      Recertification due (POC duration/ or 90 days whichever is less):4/15     Visit # POC/ Insurance Allowable Auth Needed   2 bomn []Yes    []No       History of Present condition MD note   Sudden onset back and RLE pain, possibly related to workout routine. Pain down to right buttock. She states her pain began rather suddenly 3 weeks ago. Lakisha Jackson does not recall a specific injury but thinks it may be related to her working out routine. Started having shooting pains with sit to stand at work, then unable to move. Did the medrol dose pack, and had some relief by day 5,       AP and Lateral Lumbar Spine Radiographs: There is minimal degenerative Disc Disease. There is minimal facet Arthropathy. There is no Spondylolisthesis.     SUBJECTIVE: Aching throbbing pain LS and right buttocks. 7-9/10   - pain down to 4/10 except with sit to stand. Doing HEP frequently.   Less buttock pain, more central LS pain.     Relevant Medical History:Additional Pertinent Hx: HLD, sepsis, superficial thrombosis, bunionectomy  Functional Scale/Score: [place] =  80 eval 2/14     Pain Scale:4/10  Easing factors: no specific position is an issue  Provocative factors:   sitting, sit to stand     Repeated Movements:worse with RFIS     ROM   Comments   Lumbar Flex [employer] Inc rad   Lumbar Ext Mod ltd        ROM LEFT RIGHT Comments   Lumbar Side Bend Mod ltd Mod ltd     Lumbar Rotation Mod ltd Mod ltd     Quadrant         Hip Flexion         Hip Abd wfl all wfl all      RESTRICTIONS/PRECAUTIONS: none noted    Exercises/Interventions:     Therapeutic Ex (67901)   Min: Resistance/Reps Notes/Cues        Prone position legs to right  5 min    Prop on elbows 4min         Press ups 10    Press up with sag 10    Press up with OP          Standing extension          Prone hip ext          Therapeutic Activity (56220) Min:      Posture and body mechanics Practiced stand to sit and sit to stand Pt able to do so without  Increased pain. Given another lumbar roll for car. NMR re-education (44347)   Min:     Mf Activation- re-ed     TrA Re-ed activation     Glute Max re-ed activation          Manual Intervention (35418) Min:                Modalities  Min:             Other Therapeutic Activities:  Pt was educated on PT POC, Diagnosis, Prognosis, pathomechanics as well as frequency and duration of scheduling future physical therapy appointments. Time was also taken on this day to answer all patient questions and participation in PT. Reviewed appointment policy in detail with patient and patient verbalized understanding. Home Exercise Program: Patient instructed in the following for HEP:   . Patient verbalized/demonstrated understanding and was issued written handout.       Therapeutic Exercise and NMR EXR  [x] (21502) Provided verbal/tactile cueing for activities related to strengthening, flexibility, endurance, ROM  for improvements in proximal hip and core control with self care, mobility, lifting and ambulation.  [] (52891) Provided verbal/tactile cueing for activities related to improving balance, coordination, kinesthetic sense, posture, motor skill, proprioception  to assist with core control in self care, mobility, lifting, and ambulation. Therapeutic Activities:    [x] (76039 or 49195) Provided verbal/tactile cueing for activities related to improving balance, coordination, kinesthetic sense, posture, motor skill, proprioception and motor activation to allow for proper function  with self care and ADLs  [] (58744) Provided training and instruction to the patient for proper core and proximal hip recruitment and positioning with ambulation re-education     Home Exercise Program:    [x] (87814) Reviewed/Progressed HEP activities related to strengthening, flexibility, endurance, ROM of core, proximal hip and LE for functional self-care, mobility, lifting and ambulation   [] (16634) Reviewed/Progressed HEP activities related to improving balance, coordination, kinesthetic sense, posture, motor skill, proprioception of core, proximal hip and LE for self care, mobility, lifting, and ambulation      Manual Treatments:  PROM / STM / Oscillations-Mobs:  G-I, II, III, IV (PA's, Inf., Post.)  [x] (39659) Provided manual therapy to mobilize proximal hip and LS spine soft tissue/joints for the purpose of modulating pain, promoting relaxation,  increasing ROM, reducing/eliminating soft tissue swelling/inflammation/restriction, improving soft tissue extensibility and allowing for proper ROM for normal function with self care, mobility, lifting and ambulation.        Charges:  Timed Code Treatment Minutes: 25   Total Treatment Minutes: 25     [] EVAL (LOW) 69818 (typically 20 minutes face-to-face)  [] EVAL (MOD) 70712 (typically 30 minutes face-to-face)  [] EVAL (HIGH) 32022 (typically 45 minutes face-to-face)  [] RE-EVAL     [x] MA(19113) x   2  [] Dry needle 1 or 2 Muscles (54133)  [] NMR (92695) x     [] Dry needle 3+ Muscles (04131)  [] Manual (47179) x     [] Ultrasound (62366) x  [] TA (67494) x     [] Mech Traction (20967)  [] ES(attended) (24378)     [] ES (un) (77280):   [] Vasopump (69700) [] Ionto (52140)   [] Other:    ASSESSMENT: Pt with sudden onset left radicular pain, extension bias. Will benefit from extension program with progression to core stab as able   2/18 - able to centralize pain with hep    GOALS:  Patient stated goal: To get rid of the pain  []? Progressing: []? Met: []? Not Met: []? Adjusted  Therapist goals for Patient:   Short Term Goals: To be achieved in: 2 weeks  1. Independent in HEP and progression per patient tolerance, in order to prevent re-injury. []? Progressing: []? Met: []? Not Met: []? Adjusted  2. Patient will have a decrease in pain to facilitate improvement in movement, function, and ADLs as indicated by Functional Deficits. []? Progressing: []? Met: []? Not Met: []? Adjusted     Long Term Goals: To be achieved in: 8 weeks  1. Disability index score of 20% or less for the ADIEL/[place] to assist with reaching prior level of function. []? Progressing: []? Met: []? Not Met: []? Adjusted  2. Patient will demonstrate increased AROM to WNL, good LS mobility, good hip ROM to allow for proper joint functioning as indicated by patients Functional Deficits. []? Progressing: []? Met: []? Not Met: []? Adjusted  3. Patient will demonstrate an increase in Strength to good proximal hip and core activation to allow for proper functional mobility as indicated by patients Functional Deficits. []? Progressing: []? Met: []? Not Met: []? Adjusted  4. Patient will return to 80% functional activities without increased symptoms or restriction. []? Progressing: []? Met: []? Not Met: []? Adjusted  5. To be able to get back to working out. []? Progressing: []? Met: []? Not Met: []?  Adjusted Treatment/Activity Tolerance:  [x] Patient tolerated treatment well [] Patient limited by fatique  [] Patient limited by pain  [] Patient limited by other medical complications  [] Other:     Overall Progression Towards Functional goals/ Treatment Progress Update:  [] Patient is progressing as expected towards functional goals listed. [] Progression is slowed due to complexities/Impairments listed. [] Progression has been slowed due to co-morbidities. [x] Plan just implemented, too soon to assess goals progression <30days   [] Goals require adjustment due to lack of progress  [] Patient is not progressing as expected and requires additional follow up with physician  [] Other:    Prognosis for POC: [x] Good [] Fair  [] Poor    Patient requires continued skilled intervention: [x] Yes  [] No        PLAN: See eval  [x] Continue per plan of care [] Alter current plan (see comments)  [] Plan of care initiated [] Hold pending MD visit [] Discharge    Electronically signed by: Eugenia Dickson PT DPT, MS  2022    Note: If patient does not return for scheduled/recommended follow up visits, this note will serve as a discharge from care along with the most recent update on progress.

## 2022-02-22 ENCOUNTER — HOSPITAL ENCOUNTER (OUTPATIENT)
Dept: PHYSICAL THERAPY | Age: 47
Setting detail: THERAPIES SERIES
Discharge: HOME | End: 2022-02-22
Payer: COMMERCIAL

## 2022-02-22 PROCEDURE — G0283 ELEC STIM OTHER THAN WOUND: HCPCS

## 2022-02-22 PROCEDURE — 97110 THERAPEUTIC EXERCISES: CPT

## 2022-02-22 PROCEDURE — 97140 MANUAL THERAPY 1/> REGIONS: CPT

## 2022-02-22 NOTE — FLOWSHEET NOTE
[place] [facility], [facility]  [address] [facility] [facility] [place], [facility]  Phone: [phone]   Fax:     [phone]    Physical Therapy Treatment Note/ Progress Report:     Date:  2022    Patient Name:  Lidya Dwyer    :  1975  MRN: [id number]    Pertinent Medical History: Additional Pertinent Hx: HLD, sepsis, superficial thrombosis, bunionectomy    Medical/Treatment Diagnosis Information:  · Diagnosis: S39.012A (ICD-10-CM) - Lumbosacral strain, initial encounter  · Treatment Diagnosis: Decreased functional mobility 2/2 LBP    Insurance/Certification information:  PT Insurance Information: [employer]  Physician Information:  Referring Practitioner: Aggie Farooq of care signed (Y/N): sent    Date of Patient follow up with Physician:      Progress Report: []  Yes  [x]  No     Date Range for reporting period:  Beginnin2022  Ending:    Progress report due (10 Rx/or 30 days whichever is less):      Recertification due (POC duration/ or 90 days whichever is less):4/15     Visit # POC/ Insurance Allowable Auth Needed   3 bomn []Yes    []No       History of Present condition MD note   Sudden onset back and RLE pain, possibly related to workout routine. Pain down to right buttock. She states her pain began rather suddenly 3 weeks ago. Sreedhar Taylor does not recall a specific injury but thinks it may be related to her working out routine. Started having shooting pains with sit to stand at work, then unable to move. Did the medrol dose pack, and had some relief by day 5,       AP and Lateral Lumbar Spine Radiographs: There is minimal degenerative Disc Disease. There is minimal facet Arthropathy. There is no Spondylolisthesis.     SUBJECTIVE: Aching throbbing pain LS and right buttocks. 7-/10  218 - pain down to 4/10 except with sit to stand. Doing HEP frequently.   Less buttock pain, more central LS pain.   2/22 - increased pain right LS as well as low back and right buttock. Unsure what has aggravated it. Right sided LBP feels like muscle to pt. Relevant Medical History:Additional Pertinent Hx: HLD, sepsis, superficial thrombosis, bunionectomy  Functional Scale/Score: [place] =  80 eval 2/14     Pain Scale:4/10  Easing factors: no specific position is an issue  Provocative factors:   sitting, sit to stand     Repeated Movements:worse with RFIS     ROM   Comments   Lumbar Flex Mod ltd Inc rad   Lumbar Ext Mod ltd        ROM LEFT RIGHT Comments   Lumbar Side Bend Mod ltd Mod ltd     Lumbar Rotation Mod ltd Mod ltd     Quadrant         Hip Flexion         Hip Abd wfl all wfl all      RESTRICTIONS/PRECAUTIONS: none noted    Exercises/Interventions:     Therapeutic Ex (56519)   Min: 15 Resistance/Reps Notes/Cues        Prone position legs to right  5 min    Prop on elbows 4min         Press ups 10    Press up with sag 10    Press up with OP 10         Standing extension          Prone hip ext          Therapeutic Activity (44631) Min:      Posture and body mechanics Practiced stand to sit and sit to stand Pt able to do so without  Increased pain. Given another lumbar roll for car. NMR re-education (73518)   Min:     Mf Activation- re-ed     TrA Re-ed activation     Glute Max re-ed activation          Manual Intervention (90115) Min: 15     STM right LS 10 min    PA glides Lower LS 3 min    Modalities  Min:      No chg        IFC with [facility]   20 min post ex                Other Therapeutic Activities:  Pt was educated on PT POC, Diagnosis, Prognosis, pathomechanics as well as frequency and duration of scheduling future physical therapy appointments. Time was also taken on this day to answer all patient questions and participation in PT. Reviewed appointment policy in detail with patient and patient verbalized understanding.      Home Exercise Program: Patient instructed in the following for HEP: . Patient verbalized/demonstrated understanding and was issued written handout. Therapeutic Exercise and NMR EXR  [x] (75182) Provided verbal/tactile cueing for activities related to strengthening, flexibility, endurance, ROM  for improvements in proximal hip and core control with self care, mobility, lifting and ambulation.  [] (11371) Provided verbal/tactile cueing for activities related to improving balance, coordination, kinesthetic sense, posture, motor skill, proprioception  to assist with core control in self care, mobility, lifting, and ambulation. Therapeutic Activities:    [x] (46121 or 86422) Provided verbal/tactile cueing for activities related to improving balance, coordination, kinesthetic sense, posture, motor skill, proprioception and motor activation to allow for proper function  with self care and ADLs  [] (52924) Provided training and instruction to the patient for proper core and proximal hip recruitment and positioning with ambulation re-education     Home Exercise Program:    [x] (73529) Reviewed/Progressed HEP activities related to strengthening, flexibility, endurance, ROM of core, proximal hip and LE for functional self-care, mobility, lifting and ambulation   [] (54302) Reviewed/Progressed HEP activities related to improving balance, coordination, kinesthetic sense, posture, motor skill, proprioception of core, proximal hip and LE for self care, mobility, lifting, and ambulation      Manual Treatments:  PROM / STM / Oscillations-Mobs:  G-I, II, III, IV (PA's, Inf., Post.)  [x] (57576) Provided manual therapy to mobilize proximal hip and LS spine soft tissue/joints for the purpose of modulating pain, promoting relaxation,  increasing ROM, reducing/eliminating soft tissue swelling/inflammation/restriction, improving soft tissue extensibility and allowing for proper ROM for normal function with self care, mobility, lifting and ambulation.        Charges:  Timed Code Treatment Minutes: 30   Total Treatment Minutes: 50     [] EVAL (LOW) 74144 (typically 20 minutes face-to-face)  [] EVAL (MOD) 56548 (typically 30 minutes face-to-face)  [] EVAL (HIGH) 21428 (typically 45 minutes face-to-face)  [] RE-EVAL     [x] JK(61694) x   1  [] Dry needle 1 or 2 Muscles (85042)  [] NMR (04732) x     [] Dry needle 3+ Muscles (96602)  [x] Manual (39209) x  1   [] Ultrasound (39259) x  [] TA (13200) x     [] Mech Traction (50456)  [] ES(attended) (04626)     [x] ES (un) (29748): 1  [] Vasopump (43801) [] Ionto (13527)   [] Other:    ASSESSMENT: Pt with sudden onset left radicular pain, extension bias. Will benefit from extension program with progression to core stab as able   2/18 - able to centralize pain with hep    GOALS:  Patient stated goal: To get rid of the pain  []? Progressing: []? Met: []? Not Met: []? Adjusted  Therapist goals for Patient:   Short Term Goals: To be achieved in: 2 weeks  1. Independent in HEP and progression per patient tolerance, in order to prevent re-injury. []? Progressing: []? Met: []? Not Met: []? Adjusted  2. Patient will have a decrease in pain to facilitate improvement in movement, function, and ADLs as indicated by Functional Deficits. []? Progressing: []? Met: []? Not Met: []? Adjusted     Long Term Goals: To be achieved in: 8 weeks  1. Disability index score of 20% or less for the [place]IEL[place] to assist with reaching prior level of function. []? Progressing: []? Met: []? Not Met: []? Adjusted  2. Patient will demonstrate increased AROM to WNL, good LS mobility, good hip ROM to allow for proper joint functioning as indicated by patients Functional Deficits. []? Progressing: []? Met: []? Not Met: []? Adjusted  3. Patient will demonstrate an increase in Strength to good proximal hip and core activation to allow for proper functional mobility as indicated by patients Functional Deficits. []? Progressing: []? Met: []? Not Met: []? Adjusted  4.  Patient will return to 80% functional activities without increased symptoms or restriction. []? Progressing: []? Met: []? Not Met: []? Adjusted  5. To be able to get back to working out. []? Progressing: []? Met: []? Not Met: []? Adjusted           Treatment/Activity Tolerance:  [x] Patient tolerated treatment well [] Patient limited by fatique  [] Patient limited by pain  [] Patient limited by other medical complications  [] Other:     Overall Progression Towards Functional goals/ Treatment Progress Update:  [] Patient is progressing as expected towards functional goals listed. [] Progression is slowed due to complexities/Impairments listed. [] Progression has been slowed due to co-morbidities. [x] Plan just implemented, too soon to assess goals progression <30days   [] Goals require adjustment due to lack of progress  [] Patient is not progressing as expected and requires additional follow up with physician  [] Other:    Prognosis for POC: [x] Good [] Fair  [] Poor    Patient requires continued skilled intervention: [x] Yes  [] No        PLAN: See eval  [x] Continue per plan of care [] Alter current plan (see comments)  [] Plan of care initiated [] Hold pending MD visit [] Discharge    Electronically signed by: Mariola Rankin, PT DPT, MS  [id number]    Note: If patient does not return for scheduled/recommended follow up visits, this note will serve as a discharge from care along with the most recent update on progress.

## 2022-02-25 ENCOUNTER — HOSPITAL ENCOUNTER (OUTPATIENT)
Dept: PHYSICAL THERAPY | Age: 47
Setting detail: THERAPIES SERIES
Discharge: HOME | End: 2022-02-25
Payer: COMMERCIAL

## 2022-03-01 ENCOUNTER — HOSPITAL ENCOUNTER (OUTPATIENT)
Dept: PHYSICAL THERAPY | Age: 47
Setting detail: THERAPIES SERIES
Discharge: HOME | End: 2022-03-01
Payer: COMMERCIAL

## 2022-03-01 ENCOUNTER — TELEPHONE (OUTPATIENT)
Dept: ORTHOPEDIC SURGERY | Age: 47
End: 2022-03-01

## 2022-03-01 PROCEDURE — 97140 MANUAL THERAPY 1/> REGIONS: CPT

## 2022-03-01 PROCEDURE — 97110 THERAPEUTIC EXERCISES: CPT

## 2022-03-01 PROCEDURE — 97530 THERAPEUTIC ACTIVITIES: CPT

## 2022-03-01 RX ORDER — METHYLPREDNISOLONE 4 MG/1
TABLET ORAL
Qty: 1 KIT | Refills: 0 | Status: SHIPPED | OUTPATIENT
Start: 2022-03-01

## 2022-03-01 NOTE — TELEPHONE ENCOUNTER
I called patient and she did complete steroid pack from 2/7. She said that it was recommended from physical therapy to repeat this to make progress.

## 2022-03-01 NOTE — FLOWSHEET NOTE
pain.   2/22 - increased pain right LS as well as low back and right buttock. Unsure what has aggravated it. Right sided LBP feels like muscle to pt.  3/1 - pain is not severe, able to do most things except anything heavier, ie lifting. Pain around a 4, still very significant with sit to stand. Gets better as she gets moving, then begins to feel it if she stops moving. Relevant Medical History:Additional Pertinent Hx: HLD, sepsis, superficial thrombosis, bunionectomy  Functional Scale/Score: [place] =  80 eval 2/14     Pain Scale:4/10  Easing factors: no specific position is an issue  Provocative factors:   sitting, sit to stand     Repeated Movements:worse with RFIS     ROM   Comments   Lumbar Flex [employer] Inc rad   Lumbar Ext Mod ltd        ROM LEFT RIGHT Comments   Lumbar Side Bend Mod ltd Mod ltd     Lumbar Rotation Mod ltd Mod ltd     Quadrant         Hip Flexion         Hip Abd wfl all wfl all      RESTRICTIONS/PRECAUTIONS: none noted    Exercises/Interventions:     Therapeutic Ex (98907)   Min: 15 Resistance/Reps Notes/Cues        Prone position legs to right  5 min    Prop on elbows 4min         Press ups 10    Press up with sag 10    Press up with OP 10         Standing extension X 10  Attempted with right lean, increased pain. Prone hip ext          Therapeutic Activity (69762) Min: 13     Posture and body mechanics Practiced stand to sit and sit to stand Pt able to do so without  Increased pain. Given another lumbar roll for car.      Practiced sitting and sit to stand revieiwed work sitting posture and how it may become more flexed during the day Reviewed possiblity of new chair, tucking chair under desk, moving monitor        NMR re-education (36149)   Min:     Mf Activation- re-ed     TrA Re-ed activation     Glute Max re-ed activation          Manual Intervention ([id number]) Min: 10     STM right LS 10 min    PA glides Lower LS 3 min    Modalities  Min:      No chg                    Other Therapeutic Activities:  Pt was educated on PT POC, Diagnosis, Prognosis, pathomechanics as well as frequency and duration of scheduling future physical therapy appointments. Time was also taken on this day to answer all patient questions and participation in PT. Reviewed appointment policy in detail with patient and patient verbalized understanding. Home Exercise Program: Patient instructed in the following for HEP:   . Patient verbalized/demonstrated understanding and was issued written handout. Therapeutic Exercise and NMR EXR  [x] (60759) Provided verbal/tactile cueing for activities related to strengthening, flexibility, endurance, ROM  for improvements in proximal hip and core control with self care, mobility, lifting and ambulation.  [] (48017) Provided verbal/tactile cueing for activities related to improving balance, coordination, kinesthetic sense, posture, motor skill, proprioception  to assist with core control in self care, mobility, lifting, and ambulation.      Therapeutic Activities:    [x] (84497 or 32170) Provided verbal/tactile cueing for activities related to improving balance, coordination, kinesthetic sense, posture, motor skill, proprioception and motor activation to allow for proper function  with self care and ADLs  [] (77173) Provided training and instruction to the patient for proper core and proximal hip recruitment and positioning with ambulation re-education     Home Exercise Program:    [x] (39083) Reviewed/Progressed HEP activities related to strengthening, flexibility, endurance, ROM of core, proximal hip and LE for functional self-care, mobility, lifting and ambulation   [] (27414) Reviewed/Progressed HEP activities related to improving balance, coordination, kinesthetic sense, posture, motor skill, proprioception of core, proximal hip and LE for self care, mobility, lifting, and ambulation      Manual Treatments:  PROM / STM / Oscillations-Mobs:  G-I, II, III, IV (PA's, Inf., Post.)  [x] (64476) Provided manual therapy to mobilize proximal hip and LS spine soft tissue/joints for the purpose of modulating pain, promoting relaxation,  increasing ROM, reducing/eliminating soft tissue swelling/inflammation/restriction, improving soft tissue extensibility and allowing for proper ROM for normal function with self care, mobility, lifting and ambulation. Charges:  Timed Code Treatment Minutes: 38   Total Treatment Minutes: 38     [] EVAL (LOW) 75203 (typically 20 minutes face-to-face)  [] EVAL (MOD) 77049 (typically 30 minutes face-to-face)  [] EVAL (HIGH) 29110 (typically 45 minutes face-to-face)  [] RE-EVAL     [x] IS(65643) x   1  [] Dry needle 1 or 2 Muscles (07591)  [] NMR (99308) x     [] Dry needle 3+ Muscles (65245)  [x] Manual (10520) x  1   [] Ultrasound (35310) x  [x] TA (53259) x 1     [] Mech Traction (03040)  [] ES(attended) (15615)     [] ES (un) (52676): 1  [] Vasopump (36626) [] Ionto (69766)   [] Other:    ASSESSMENT: Pt with sudden onset left radicular pain, extension bias. Will benefit from extension program with progression to core stab as able   2/18 - able to centralize pain with hep    GOALS:  Patient stated goal: To get rid of the pain  []? Progressing: []? Met: []? Not Met: []? Adjusted  Therapist goals for Patient:   Short Term Goals: To be achieved in: 2 weeks  1. Independent in HEP and progression per patient tolerance, in order to prevent re-injury. [x]? Progressing: []? Met: []? Not Met: []? Adjusted  2. Patient will have a decrease in pain to facilitate improvement in movement, function, and ADLs as indicated by Functional Deficits. [x]? Progressing: []? Met: []? Not Met: []? Adjusted     Long Term Goals: To be achieved in: 8 weeks  1. Disability index score of 20% or less for the ADIEL/[place] to assist with reaching prior level of function. [x]? Progressing: []? Met: []? Not Met: []? Adjusted  2.  Patient will demonstrate increased AROM to WNL, good LS mobility, good hip ROM to allow for proper joint functioning as indicated by patients Functional Deficits. [x]? Progressing: []? Met: []? Not Met: []? Adjusted  3. Patient will demonstrate an increase in Strength to good proximal hip and core activation to allow for proper functional mobility as indicated by patients Functional Deficits. [x]? Progressing: []? Met: []? Not Met: []? Adjusted  4. Patient will return to 80% functional activities without increased symptoms or restriction. [x]? Progressing: []? Met: []? Not Met: []? Adjusted  5. To be able to get back to working out. [x]? Progressing: []? Met: []? Not Met: []? Adjusted           Treatment/Activity Tolerance:  [x] Patient tolerated treatment well [] Patient limited by fatique  [] Patient limited by pain  [] Patient limited by other medical complications  [] Other:     Overall Progression Towards Functional goals/ Treatment Progress Update:  [] Patient is progressing as expected towards functional goals listed. [] Progression is slowed due to complexities/Impairments listed. [] Progression has been slowed due to co-morbidities. [x] Plan just implemented, too soon to assess goals progression <30days   [] Goals require adjustment due to lack of progress  [] Patient is not progressing as expected and requires additional follow up with physician  [] Other:    Prognosis for POC: [x] Good [] Fair  [] Poor    Patient requires continued skilled intervention: [x] Yes  [] No        PLAN: Continue per POC -  Contact MD for possible steroid dose pack  [x] Continue per plan of care [] Alter current plan (see comments)  [] Plan of care initiated [] Hold pending MD visit [] Discharge    Electronically signed by: Vika Roberts, PT DPT, MS  [id number]    Note: If patient does not return for scheduled/recommended follow up visits, this note will serve as a discharge from care along with the most recent update on progress.

## 2022-03-01 NOTE — TELEPHONE ENCOUNTER
Prescription Refill     Medication Name:  RONNY CHRISTIAN WANTS THIS CALLED IN.   Pharmacy: [id number]  Patient Contact Number:  [phone]

## 2022-03-04 ENCOUNTER — HOSPITAL ENCOUNTER (OUTPATIENT)
Dept: PHYSICAL THERAPY | Age: 47
Setting detail: THERAPIES SERIES
Discharge: HOME | End: 2022-03-04
Payer: COMMERCIAL

## 2022-03-04 PROCEDURE — 97110 THERAPEUTIC EXERCISES: CPT

## 2022-03-04 NOTE — FLOWSHEET NOTE
[place] [facility], [facility]  [address] [facility] [place]  Phone: [phone]   Fax:     [phone]    Physical Therapy Treatment Note/ Progress Report:     Date:  3/4/2022    Patient Name:  Autumn Hamilton    :  1975  MRN: [id number]    Pertinent Medical History: Additional Pertinent Hx: HLD, sepsis, superficial thrombosis, bunionectomy    Medical/Treatment Diagnosis Information:  · Diagnosis: S39.012A (ICD-10-CM) - Lumbosacral strain, initial encounter  · Treatment Diagnosis: Decreased functional mobility 2/2 LBP    Insurance/Certification information:  PT Insurance Information: [employer]  Physician Information:  Referring Practitioner: Oscar Nelson of care signed (Y/N): sent    Date of Patient follow up with Physician:      Progress Report: []  Yes  [x]  No     Date Range for reporting period:  Beginning: 3/4/2022  Ending:    Progress report due (10 Rx/or 30 days whichever is less):      Recertification due (POC duration/ or 90 days whichever is less):4/15     Visit # POC/ Insurance Allowable Auth Needed   5 bomn []Yes    []No       History of Present condition MD note   Sudden onset back and RLE pain, possibly related to workout routine. Pain down to right buttock. She states her pain began rather suddenly 3 weeks ago. Crissy Hernandez does not recall a specific injury but thinks it may be related to her working out routine. Started having shooting pains with sit to stand at work, then unable to move. Did the medrol dose pack, and had some relief by day 5,       AP and Lateral Lumbar Spine Radiographs: There is minimal degenerative Disc Disease. There is minimal facet Arthropathy. There is no Spondylolisthesis.     SUBJECTIVE: Aching throbbing pain LS and right buttocks. 7-9/10  218 - pain down to 4/10 except with sit to stand. Doing HEP frequently.   Less buttock pain, more central LS pain.   2/22 - increased pain right LS as well as low back and right buttock. Unsure what has aggravated it. Right sided LBP feels like muscle to pt.  3/1 - pain is not severe, able to do most things except anything heavier, ie lifting. Pain around a 4, still very significant with sit to stand. Gets better as she gets moving, then begins to feel it if she stops moving. 3/4 - much better with steroid dose pack  1-2/10  LBP, much better with sit to stand/normal activity    Relevant Medical History:Additional Pertinent Hx: HLD, sepsis, superficial thrombosis, bunionectomy  Functional Scale/Score: [place] =  80 eval 2/14     Pain Scale:4/10  Easing factors: no specific position is an issue  Provocative factors:   sitting, sit to stand     Repeated Movements:worse with RFIS     ROM   Comments   Lumbar Flex Mod ltd Inc rad   Lumbar Ext Mod ltd        ROM LEFT RIGHT Comments   Lumbar Side Bend Mod ltd Mod ltd     Lumbar Rotation Mod ltd Mod ltd     Quadrant         Hip Flexion         Hip Abd wfl all wfl all      RESTRICTIONS/PRECAUTIONS: none noted    Exercises/Interventions:     Therapeutic Ex (77903)   Min: 15 Resistance/Reps Notes/Cues        Prop on elbows 4min         Press ups 10    Press up with sag 10    Press up with OP 10         Standing extension X 10  . Prone hip ext          Therapeutic Activity (95296) Min:      Posture and body mechanics Practiced stand to sit and sit to stand Pt able to do so without  Increased pain. Given another lumbar roll for car.      Practiced sitting and sit to stand revieiwed work sitting posture and how it may become more flexed during the day Reviewed possiblity of new chair, tucking chair under desk, moving monitor        NMR re-education (21112)   Min:     Mf Activation- re-ed     TrA Re-ed activation     Glute Max re-ed activation          Manual Intervention ([id number]) Min: 3          PA glides Lower LS 3 min    Modalities  Min:      No chg                    Other Therapeutic Activities:  Pt was educated on PT POC, Diagnosis, Prognosis, pathomechanics as well as frequency and duration of scheduling future physical therapy appointments. Time was also taken on this day to answer all patient questions and participation in PT. Reviewed appointment policy in detail with patient and patient verbalized understanding. Home Exercise Program: Patient instructed in the following for HEP:   . Patient verbalized/demonstrated understanding and was issued written handout. Therapeutic Exercise and NMR EXR  [x] (12578) Provided verbal/tactile cueing for activities related to strengthening, flexibility, endurance, ROM  for improvements in proximal hip and core control with self care, mobility, lifting and ambulation.  [] (22963) Provided verbal/tactile cueing for activities related to improving balance, coordination, kinesthetic sense, posture, motor skill, proprioception  to assist with core control in self care, mobility, lifting, and ambulation.      Therapeutic Activities:    [x] (66472 or 33647) Provided verbal/tactile cueing for activities related to improving balance, coordination, kinesthetic sense, posture, motor skill, proprioception and motor activation to allow for proper function  with self care and ADLs  [] (64511) Provided training and instruction to the patient for proper core and proximal hip recruitment and positioning with ambulation re-education     Home Exercise Program:    [x] (10806) Reviewed/Progressed HEP activities related to strengthening, flexibility, endurance, ROM of core, proximal hip and LE for functional self-care, mobility, lifting and ambulation   [] (01784) Reviewed/Progressed HEP activities related to improving balance, coordination, kinesthetic sense, posture, motor skill, proprioception of core, proximal hip and LE for self care, mobility, lifting, and ambulation      Manual Treatments:  PROM / STM / Oscillations-Mobs:  G-I, II, III, IV (PA's, Inf., Post.)  [x] (85409) Provided manual therapy to mobilize proximal hip and LS spine soft tissue/joints for the purpose of modulating pain, promoting relaxation,  increasing ROM, reducing/eliminating soft tissue swelling/inflammation/restriction, improving soft tissue extensibility and allowing for proper ROM for normal function with self care, mobility, lifting and ambulation. Charges:  Timed Code Treatment Minutes: 38   Total Treatment Minutes: 38     [] EVAL (LOW) 54743 (typically 20 minutes face-to-face)  [] EVAL (MOD) 92780 (typically 30 minutes face-to-face)  [] EVAL (HIGH) 50682 (typically 45 minutes face-to-face)  [] RE-EVAL     [x] WQ(64276) x   1  [] Dry needle 1 or 2 Muscles (08108)  [] NMR (67839) x     [] Dry needle 3+ Muscles (54065)  [] Manual (09307) x     [] Ultrasound (14593) x  [] TA (41722) x      [] Mech Traction (35076)  [] ES(attended) (22270)     [] ES (un) (65997): 1  [] Vasopump (99383) [] Ionto (61685)   [] Other:    ASSESSMENT: Pt with sudden onset left radicular pain, extension bias. Will benefit from extension program with progression to core stab as able   2/18 - able to centralize pain with hep  3/4 - with steroid dose pack much better. Reviewed importance of being dilligent with extension/posture/body mechanics. Pt concurs. GOALS:  Patient stated goal: To get rid of the pain  []? Progressing: []? Met: []? Not Met: []? Adjusted  Therapist goals for Patient:   Short Term Goals: To be achieved in: 2 weeks  1. Independent in HEP and progression per patient tolerance, in order to prevent re-injury. [x]? Progressing: []? Met: []? Not Met: []? Adjusted  2. Patient will have a decrease in pain to facilitate improvement in movement, function, and ADLs as indicated by Functional Deficits. [x]? Progressing: []? Met: []? Not Met: []? Adjusted     Long Term Goals: To be achieved in: 8 weeks  1.  Disability index score of 20% or less for the ADIEL/[place] to assist with reaching prior level of function. [x]? Progressing: []? Met: []? Not Met: []? Adjusted  2. Patient will demonstrate increased AROM to WNL, good LS mobility, good hip ROM to allow for proper joint functioning as indicated by patients Functional Deficits. [x]? Progressing: []? Met: []? Not Met: []? Adjusted  3. Patient will demonstrate an increase in Strength to good proximal hip and core activation to allow for proper functional mobility as indicated by patients Functional Deficits. [x]? Progressing: []? Met: []? Not Met: []? Adjusted  4. Patient will return to 80% functional activities without increased symptoms or restriction. [x]? Progressing: []? Met: []? Not Met: []? Adjusted  5. To be able to get back to working out. [x]? Progressing: []? Met: []? Not Met: []? Adjusted           Treatment/Activity Tolerance:  [x] Patient tolerated treatment well [] Patient limited by fatique  [] Patient limited by pain  [] Patient limited by other medical complications  [] Other:     Overall Progression Towards Functional goals/ Treatment Progress Update:  [] Patient is progressing as expected towards functional goals listed. [] Progression is slowed due to complexities/Impairments listed. [] Progression has been slowed due to co-morbidities. [x] Plan just implemented, too soon to assess goals progression <30days   [] Goals require adjustment due to lack of progress  [] Patient is not progressing as expected and requires additional follow up with physician  [] Other:    Prognosis for POC: [x] Good [] Fair  [] Poor    Patient requires continued skilled intervention: [x] Yes  [] No        PLAN: Continue per POC -see in one week.     [x] Continue per plan of care [] Alter current plan (see comments)  [] Plan of care initiated [] Hold pending MD visit [] Discharge    Electronically signed by: Brenda Rodriguez, PT DPT, MS  [id number]    Note: If patient does not return for scheduled/recommended follow up visits, this note will serve as a discharge from care along with the most recent update on progress.

## 2022-03-08 ENCOUNTER — APPOINTMENT (OUTPATIENT)
Dept: PHYSICAL THERAPY | Age: 47
End: 2022-03-08
Payer: COMMERCIAL

## 2022-03-11 ENCOUNTER — HOSPITAL ENCOUNTER (OUTPATIENT)
Dept: PHYSICAL THERAPY | Age: 47
Setting detail: THERAPIES SERIES
Discharge: HOME | End: 2022-03-11
Payer: COMMERCIAL

## 2022-03-11 PROCEDURE — 97140 MANUAL THERAPY 1/> REGIONS: CPT

## 2022-03-11 PROCEDURE — 97035 APP MDLTY 1+ULTRASOUND EA 15: CPT

## 2022-03-11 PROCEDURE — 97110 THERAPEUTIC EXERCISES: CPT

## 2022-03-11 NOTE — FLOWSHEET NOTE
[address] [facility], [facility]  [address] [facility] [place], [facility]  Phone: [phone]   Fax:     [phone]    Physical Therapy Treatment Note/ Progress Report:     Date:  3/11/2022    Patient Name:  Aviva Berg    :  1975  MRN: [id number]    Pertinent Medical History: Additional Pertinent Hx: HLD, sepsis, superficial thrombosis, bunionectomy    Medical/Treatment Diagnosis Information:  · Diagnosis: S39.012A (ICD-10-CM) - Lumbosacral strain, initial encounter  · Treatment Diagnosis: Decreased functional mobility 2/2 LBP    Insurance/Certification information:  PT Insurance Information: [employer]  Physician Information:  Referring Practitioner: Bonnie Hutchinson signed (Y/N): sent    Date of Patient follow up with Physician:      Progress Report: []  Yes  [x]  No     Date Range for reporting period:  Beginning: 3/11/2022  Ending:    Progress report due (10 Rx/or 30 days whichever is less): 3/50     Recertification due (POC duration/ or 90 days whichever is less):4/15     Visit # POC/ Insurance Allowable Auth Needed   6 bomn []Yes    []No       History of Present condition MD note   Sudden onset back and RLE pain, possibly related to workout routine. Pain down to right buttock. She states her pain began rather suddenly 3 weeks ago. Sherley Pina does not recall a specific injury but thinks it may be related to her working out routine. Started having shooting pains with sit to stand at work, then unable to move. Did the medrol dose pack, and had some relief by day 5,       AP and Lateral Lumbar Spine Radiographs: There is minimal degenerative Disc Disease. There is minimal facet Arthropathy. There is no Spondylolisthesis.     SUBJECTIVE: Aching throbbing pain LS and right buttocks. 7-9/10  2/18 - pain down to 4/10 except with sit to stand. Doing HEP frequently.   Less buttock pain, more central LS pain.   2/22 - increased pain right LS as well as low back and right buttock. Unsure what has aggravated it. Right sided LBP feels like muscle to pt.  3/1 - pain is not severe, able to do most things except anything heavier, ie lifting. Pain around a 4, still very significant with sit to stand. Gets better as she gets moving, then begins to feel it if she stops moving. 3/4 - much better with steroid dose pack  1-2/10  LBP, much better with sit to stand/normal activity   3/11 - steroid dose pack done. Pain is better but now again getting the pinching with sit to stand. Stabbing pain with sneezing and coughing,      Relevant Medical History:Additional Pertinent Hx: HLD, sepsis, superficial thrombosis, bunionectomy  Functional Scale/Score: [place] =  80 eval 2/14     Pain Scale:4/10  Easing factors: no specific position is an issue  Provocative factors:   sitting, sit to stand     Repeated Movements:worse with RFIS     ROM   Comments   Lumbar Flex Mod ltd Inc rad   Lumbar Ext Mod ltd        ROM LEFT RIGHT Comments   Lumbar Side Bend Mod ltd Mod ltd     Lumbar Rotation Mod ltd Mod ltd     Quadrant         Hip Flexion         Hip Abd wfl all wfl all      RESTRICTIONS/PRECAUTIONS: none noted    Exercises/Interventions:     Therapeutic Ex (91098)   Min: 15 Resistance/Reps Notes/Cues        Prop on elbows 4min         Press ups 10    Press up with sag 10    Press up with OP 10         Standing extension X 10  . Prone hip ext          Therapeutic Activity (79988) Min:      Posture and body mechanics Practiced stand to sit and sit to stand Pt able to do so without  Increased pain. Given another lumbar roll for car.      Practiced sitting and sit to stand revieiwed work sitting posture and how it may become more flexed during the day Reviewed possiblity of new chair, tucking chair under desk, moving monitor        NMR re-education (83039)   Min:     Mf Activation- re-ed     TrA Re-ed activation     Glute Max re-ed activation          Manual Intervention ([id number]) Min:15     STM right LS/gluts 12 min    PA glides Lower LS 3 min    Modalities  Min:      US 1.5 w/cm2 100% Right LS No chg                    Other Therapeutic Activities:  Pt was educated on PT POC, Diagnosis, Prognosis, pathomechanics as well as frequency and duration of scheduling future physical therapy appointments. Time was also taken on this day to answer all patient questions and participation in PT. Reviewed appointment policy in detail with patient and patient verbalized understanding. Home Exercise Program: Patient instructed in the following for HEP:   . Patient verbalized/demonstrated understanding and was issued written handout. Therapeutic Exercise and NMR EXR  [x] (08696) Provided verbal/tactile cueing for activities related to strengthening, flexibility, endurance, ROM  for improvements in proximal hip and core control with self care, mobility, lifting and ambulation.  [] (77196) Provided verbal/tactile cueing for activities related to improving balance, coordination, kinesthetic sense, posture, motor skill, proprioception  to assist with core control in self care, mobility, lifting, and ambulation.      Therapeutic Activities:    [x] (59098 or 22666) Provided verbal/tactile cueing for activities related to improving balance, coordination, kinesthetic sense, posture, motor skill, proprioception and motor activation to allow for proper function  with self care and ADLs  [] (45459) Provided training and instruction to the patient for proper core and proximal hip recruitment and positioning with ambulation re-education     Home Exercise Program:    [x] (39986) Reviewed/Progressed HEP activities related to strengthening, flexibility, endurance, ROM of core, proximal hip and LE for functional self-care, mobility, lifting and ambulation   [] (54824) Reviewed/Progressed HEP activities related to improving balance, coordination, kinesthetic sense, posture, motor skill, proprioception of core, proximal hip and LE for self care, mobility, lifting, and ambulation      Manual Treatments:  PROM / STM / Oscillations-Mobs:  G-I, II, III, IV (PA's, Inf., Post.)  [x] (50934) Provided manual therapy to mobilize proximal hip and LS spine soft tissue/joints for the purpose of modulating pain, promoting relaxation,  increasing ROM, reducing/eliminating soft tissue swelling/inflammation/restriction, improving soft tissue extensibility and allowing for proper ROM for normal function with self care, mobility, lifting and ambulation. Charges:  Timed Code Treatment Minutes: 38   Total Treatment Minutes: 38     [] EVAL (LOW) 57073 (typically 20 minutes face-to-face)  [] EVAL (MOD) 80482 (typically 30 minutes face-to-face)  [] EVAL (HIGH) 05569 (typically 45 minutes face-to-face)  [] RE-EVAL     [x] CT(09578) x   1  [] Dry needle 1 or 2 Muscles (16315)  [] NMR (37831) x     [] Dry needle 3+ Muscles (25930)  [x] Manual (29912) x  1   [x] Ultrasound (50392) x  [] TA (70173) x      [] Mech Traction (13461)  [] ES(attended) (26625)     [] ES (un) (14815): 1  [] Vasopump (45152) [] Ionto (01142)   [] Other:    ASSESSMENT: Pt with sudden onset left radicular pain, extension bias. Will benefit from extension program with progression to core stab as able   2/18 - able to centralize pain with hep  3/4 - with steroid dose pack much better. Reviewed importance of being dilligent with extension/posture/body mechanics. Pt concurs. 3/11 - better after dose pack, but still symptomatic - To call MD if symptoms do not continue to diminish. GOALS:  Patient stated goal: To get rid of the pain  []? Progressing: []? Met: []? Not Met: []? Adjusted  Therapist goals for Patient:   Short Term Goals: To be achieved in: 2 weeks  1. Independent in HEP and progression per patient tolerance, in order to prevent re-injury. [x]? Progressing: []? Met: []? Not Met: []? Adjusted  2.  Patient will have a decrease in pain to facilitate improvement in movement, function, and ADLs as indicated by Functional Deficits. [x]? Progressing: []? Met: []? Not Met: []? Adjusted     Long Term Goals: To be achieved in: 8 weeks  1. Disability index score of 20% or less for the ADIEL/[place] to assist with reaching prior level of function. [x]? Progressing: []? Met: []? Not Met: []? Adjusted  2. Patient will demonstrate increased AROM to WNL, good LS mobility, good hip ROM to allow for proper joint functioning as indicated by patients Functional Deficits. [x]? Progressing: []? Met: []? Not Met: []? Adjusted  3. Patient will demonstrate an increase in Strength to good proximal hip and core activation to allow for proper functional mobility as indicated by patients Functional Deficits. [x]? Progressing: []? Met: []? Not Met: []? Adjusted  4. Patient will return to 80% functional activities without increased symptoms or restriction. [x]? Progressing: []? Met: []? Not Met: []? Adjusted  5. To be able to get back to working out. [x]? Progressing: []? Met: []? Not Met: []? Adjusted           Treatment/Activity Tolerance:  [x] Patient tolerated treatment well [] Patient limited by fatique  [] Patient limited by pain  [] Patient limited by other medical complications  [] Other:     Overall Progression Towards Functional goals/ Treatment Progress Update:  [] Patient is progressing as expected towards functional goals listed. [] Progression is slowed due to complexities/Impairments listed. [] Progression has been slowed due to co-morbidities.   [x] Plan just implemented, too soon to assess goals progression <30days   [] Goals require adjustment due to lack of progress  [] Patient is not progressing as expected and requires additional follow up with physician  [] Other:    Prognosis for POC: [x] Good [] Fair  [] Poor    Patient requires continued skilled intervention: [x] Yes  [] No        PLAN: Continue per POC -see in one week.    [x] Continue per plan of care [] Alter current plan (see comments)  [] Plan of care initiated [] Hold pending MD visit [] Discharge    Electronically signed by: Ariana Mazariegos PT DPT, MS  [id number]    Note: If patient does not return for scheduled/recommended follow up visits, this note will serve as a discharge from care along with the most recent update on progress.

## 2022-03-15 ENCOUNTER — APPOINTMENT (OUTPATIENT)
Dept: PHYSICAL THERAPY | Age: 47
End: 2022-03-15
Payer: COMMERCIAL

## 2022-03-18 ENCOUNTER — APPOINTMENT (OUTPATIENT)
Dept: PHYSICAL THERAPY | Age: 47
End: 2022-03-18
Payer: COMMERCIAL

## 2022-04-28 ENCOUNTER — HOSPITAL ENCOUNTER (OUTPATIENT)
Dept: ULTRASOUND IMAGING | Age: 47
Discharge: HOME | End: 2022-04-28
Payer: COMMERCIAL

## 2022-04-28 DIAGNOSIS — R79.89 HYPOURICEMIA: ICD-10-CM

## 2022-04-28 LAB
ALBUMIN SERPL-MCNC: 4.6 G/DL (ref 3.4–5)
ALP BLD-CCNC: 53 U/L (ref 40–129)
ALT SERPL-CCNC: 174 U/L (ref 10–40)
AST SERPL-CCNC: 99 U/L (ref 15–37)
BILIRUB SERPL-MCNC: 0.4 MG/DL (ref 0–1)
BILIRUBIN DIRECT: <0.2 MG/DL (ref 0–0.3)
BILIRUBIN, INDIRECT: ABNORMAL MG/DL (ref 0–1)
IGG: 954 MG/DL (ref 700–1600)
IRON SATURATION: 22 % (ref 15–50)
IRON: 68 UG/DL (ref 37–145)
TOTAL IRON BINDING CAPACITY: 308 UG/DL (ref 260–445)
TOTAL PROTEIN: 6.9 G/DL (ref 6.4–8.2)

## 2022-04-28 PROCEDURE — 76705 ECHO EXAM OF ABDOMEN: CPT

## 2022-04-29 LAB — ANTI-NUCLEAR ANTIBODY (ANA): NEGATIVE

## (undated) DEVICE — E-Z CLEAN, NON-STICK, PTFE COATED, ELECTROSURGICAL BLADE ELECTRODE, 2.5 INCH (6.35 CM): Brand: EZ CLEAN

## (undated) DEVICE — SPEEDGUIDE DRILL AO: Brand: VARIAX

## (undated) DEVICE — CANNULATED DRILL

## (undated) DEVICE — PADDING CAST W4INXL4YD ST COT RAYON MICROPLEATED HIGHLY

## (undated) DEVICE — SUTURE VCRL SZ 3-0 L27IN ABSRB UD L26MM CT-2 1/2 CIR J232H

## (undated) DEVICE — STANDARD HYPODERMIC NEEDLE,POLYPROPYLENE HUB: Brand: MONOJECT

## (undated) DEVICE — PLATE ES AD W 9FT CRD 2

## (undated) DEVICE — STRIP,CLOSURE,WOUND,MEDI-STRIP,1/2X4: Brand: MEDLINE

## (undated) DEVICE — REAMER FOR CROSS-PLATES: Brand: ANCHORAGE

## (undated) DEVICE — SUTURE VCRL SZ 4-0 L27IN ABSRB UD L26MM SH 1/2 CIR J415H

## (undated) DEVICE — DRAPE C ARM W54XL84IN MINI FOR OEC 6800

## (undated) DEVICE — COVER LT HNDL BLU PLAS

## (undated) DEVICE — GARMENT,MEDLINE,DVT,INT,CALF,MED, GEN2: Brand: MEDLINE

## (undated) DEVICE — APPLICATOR MEDICATED 26 CC SOLUTION HI LT ORNG CHLORAPREP

## (undated) DEVICE — PADDING CAST W4INXL4YD HIGHLY ABSRB THAN COT EZ APPL

## (undated) DEVICE — SOLUTION IV 1000ML 0.9% SOD CHL

## (undated) DEVICE — DRAPE EQUIP FOOTSWITCH 24X20 IN PUL CORDAND CRD LCK NS

## (undated) DEVICE — SUTURE MCRYL SZ 4-0 L27IN ABSRB UD L19MM PS-2 1/2 CIR PRIM Y426H

## (undated) DEVICE — MICRO SAGITTAL BLADE (9.4 X 0.4 X 26.2MM)

## (undated) DEVICE — ADHESIVE SKIN CLSR 0.7ML TOP DERMBND ADV

## (undated) DEVICE — INTENDED FOR TISSUE SEPARATION, AND OTHER PROCEDURES THAT REQUIRE A SHARP SURGICAL BLADE TO PUNCTURE OR CUT.: Brand: BARD-PARKER ® CARBON RIB-BACK BLADES

## (undated) DEVICE — JEWISH HOSPITAL TURNOVER KIT: Brand: MEDLINE INDUSTRIES, INC.

## (undated) DEVICE — BLANKET WRM W29.9XL79.1IN UP BODY FORC AIR MISTRAL-AIR

## (undated) DEVICE — ZIMMER® STERILE DISPOSABLE TOURNIQUET CUFF WITH PLC, DUAL PORT, DUAL BLADDER, 18 IN. (46 CM)

## (undated) DEVICE — GLOVE ORANGE PI 8   MSG9080

## (undated) DEVICE — SYRINGE MED 10ML LUERLOCK TIP W/O SFTY DISP

## (undated) DEVICE — PODIATRY PK

## (undated) DEVICE — HOLDING PIN: Brand: ANCHORAGE